# Patient Record
Sex: FEMALE | Race: WHITE | Employment: OTHER | URBAN - METROPOLITAN AREA
[De-identification: names, ages, dates, MRNs, and addresses within clinical notes are randomized per-mention and may not be internally consistent; named-entity substitution may affect disease eponyms.]

---

## 2018-03-28 ENCOUNTER — HOSPITAL ENCOUNTER (EMERGENCY)
Age: 68
Discharge: HOME | End: 2018-03-28
Attending: FAMILY MEDICINE
Payer: MEDICARE

## 2018-03-28 VITALS
BODY MASS INDEX: 24.45 KG/M2 | TEMPERATURE: 97.9 F | OXYGEN SATURATION: 94 % | DIASTOLIC BLOOD PRESSURE: 84 MMHG | RESPIRATION RATE: 18 BRPM | SYSTOLIC BLOOD PRESSURE: 180 MMHG | HEIGHT: 63 IN | HEART RATE: 78 BPM | WEIGHT: 138 LBS

## 2018-03-28 DIAGNOSIS — J40 BRONCHITIS: Primary | ICD-10-CM

## 2018-03-28 PROCEDURE — 99282 EMERGENCY DEPT VISIT SF MDM: CPT

## 2018-03-28 RX ORDER — HYDROXYCHLOROQUINE SULFATE 200 MG/1
TABLET, FILM COATED ORAL DAILY
COMMUNITY
End: 2020-11-09

## 2018-03-28 RX ORDER — PREDNISONE 10 MG/1
10 TABLET ORAL DAILY
Qty: 18 TABLET | Refills: 0 | Status: SHIPPED | OUTPATIENT
Start: 2018-03-28 | End: 2018-04-07

## 2018-03-28 RX ORDER — GABAPENTIN 300 MG/1
300 CAPSULE ORAL 2 TIMES DAILY
COMMUNITY

## 2018-03-28 RX ORDER — DOXYCYCLINE HYCLATE 100 MG
100 TABLET ORAL 2 TIMES DAILY
Qty: 20 TABLET | Refills: 0 | Status: SHIPPED | OUTPATIENT
Start: 2018-03-28 | End: 2018-04-07

## 2018-03-28 RX ORDER — BENZONATATE 100 MG/1
100 CAPSULE ORAL 3 TIMES DAILY PRN
COMMUNITY
End: 2020-11-09

## 2018-03-28 ASSESSMENT — ENCOUNTER SYMPTOMS
DIARRHEA: 0
VOMITING: 0
EYE PAIN: 0
WHEEZING: 1
EYE DISCHARGE: 0
SHORTNESS OF BREATH: 0
ABDOMINAL DISTENTION: 0
NAUSEA: 0
BACK PAIN: 0
SINUS PRESSURE: 0
SORE THROAT: 0
COUGH: 1
EYE REDNESS: 0

## 2018-03-28 NOTE — ED PROVIDER NOTES
reviewed. Procedures    MDM    ED Course      --------------------------------------------- PAST HISTORY ---------------------------------------------  Past Medical History:  has a past medical history of Anemia; Blood transfusion; Cancer ([facility] History of blood transfusion; Hx of blood clots; Hyperlipidemia; Hypertension; and PVD (peripheral vascular disease) ([facility] Past Surgical History:  has a past surgical history that includes Abdomen surgery; Hysterectomy; Appendectomy; Endoscopy, colon, diagnostic; Colonoscopy; other surgical history (2004); Colon surgery (2005); vascular surgery (2009); vascular surgery (2014); Tongue Biopsy; and transluminal angioplasty (Right, 4/15/15). Social History:  reports that she quit smoking about 14 years ago. She has a 60.00 pack-year smoking history. She has never used smokeless tobacco. She reports that she does not drink alcohol or use drugs. Family History: family history is not on file. The patients home medications have been reviewed. Allergies: Patient has no known allergies. -------------------------------------------------- RESULTS -------------------------------------------------  No results found for this visit on 03/28/18. No orders to display       ------------------------- NURSING NOTES AND VITALS REVIEWED ---------------------------   The nursing notes within the ED encounter and vital signs as below have been reviewed. BP (!) 180/84   Pulse 78   Temp 97.9 °F (36.6 °C) (Oral)   Resp 18   Ht 5' 3\" (1.6 m)   Wt 138 lb (62.6 kg)   SpO2 94%   BMI 24.45 kg/m²   Oxygen Saturation Interpretation: Normal      ------------------------------------------ PROGRESS NOTES ------------------------------------------   I have spoken with the patient and discussed todays results, in addition to providing specific details for the plan of care and counseling regarding the diagnosis and prognosis.   Their questions are answered at this time and they

## 2018-06-03 ENCOUNTER — HOSPITAL ENCOUNTER (EMERGENCY)
Age: 68
Discharge: HOME | End: 2018-06-03
Attending: EMERGENCY MEDICINE
Payer: MEDICARE

## 2018-06-03 ENCOUNTER — APPOINTMENT (OUTPATIENT)
Dept: GENERAL RADIOLOGY | Age: 68
End: 2018-06-03
Payer: MEDICARE

## 2018-06-03 VITALS
SYSTOLIC BLOOD PRESSURE: 156 MMHG | OXYGEN SATURATION: 93 % | RESPIRATION RATE: 20 BRPM | BODY MASS INDEX: 24.8 KG/M2 | WEIGHT: 140 LBS | TEMPERATURE: 98.4 F | DIASTOLIC BLOOD PRESSURE: 70 MMHG | HEART RATE: 75 BPM | HEIGHT: 63 IN

## 2018-06-03 DIAGNOSIS — J20.9 ACUTE BRONCHITIS, UNSPECIFIED ORGANISM: Primary | ICD-10-CM

## 2018-06-03 PROCEDURE — 99283 EMERGENCY DEPT VISIT LOW MDM: CPT

## 2018-06-03 PROCEDURE — 71046 X-RAY EXAM CHEST 2 VIEWS: CPT

## 2018-06-03 RX ORDER — DOXYCYCLINE HYCLATE 100 MG
100 TABLET ORAL 2 TIMES DAILY
Qty: 20 TABLET | Refills: 0 | Status: SHIPPED | OUTPATIENT
Start: 2018-06-03 | End: 2018-06-13

## 2018-06-03 ASSESSMENT — PAIN DESCRIPTION - PAIN TYPE: TYPE: ACUTE PAIN

## 2018-06-03 ASSESSMENT — PAIN DESCRIPTION - ORIENTATION: ORIENTATION: ANTERIOR

## 2018-06-03 ASSESSMENT — PAIN DESCRIPTION - LOCATION: LOCATION: CHEST

## 2018-06-03 ASSESSMENT — PAIN SCALES - GENERAL: PAINLEVEL_OUTOF10: 4

## 2018-07-16 ENCOUNTER — APPOINTMENT (OUTPATIENT)
Dept: GENERAL RADIOLOGY | Age: 68
DRG: 287 | End: 2018-07-16
Payer: MEDICARE

## 2018-07-16 ENCOUNTER — APPOINTMENT (OUTPATIENT)
Dept: CT IMAGING | Age: 68
DRG: 287 | End: 2018-07-16
Payer: MEDICARE

## 2018-07-16 ENCOUNTER — HOSPITAL ENCOUNTER (INPATIENT)
Age: 68
LOS: 2 days | Discharge: HOME | DRG: 287 | End: 2018-07-18
Attending: EMERGENCY MEDICINE | Admitting: FAMILY MEDICINE
Payer: MEDICARE

## 2018-07-16 DIAGNOSIS — R07.9 CHEST PAIN, UNSPECIFIED TYPE: Primary | ICD-10-CM

## 2018-07-16 LAB
ALBUMIN SERPL-MCNC: 4.6 G/DL (ref 3.5–5.2)
ALP BLD-CCNC: 79 U/L (ref 35–104)
ALT SERPL-CCNC: 20 U/L (ref 0–32)
ANION GAP SERPL CALCULATED.3IONS-SCNC: 15 MMOL/L (ref 7–16)
APTT: 28.3 SEC (ref 24.5–35.1)
AST SERPL-CCNC: 23 U/L (ref 0–31)
BASOPHILS ABSOLUTE: 0.05 E9/L (ref 0–0.2)
BASOPHILS RELATIVE PERCENT: 0.6 % (ref 0–2)
BILIRUB SERPL-MCNC: 0.5 MG/DL (ref 0–1.2)
BUN BLDV-MCNC: 8 MG/DL (ref 8–23)
CALCIUM SERPL-MCNC: 10.3 MG/DL (ref 8.6–10.2)
CHLORIDE BLD-SCNC: 99 MMOL/L (ref 98–107)
CO2: 26 MMOL/L (ref 22–29)
CREAT SERPL-MCNC: 0.8 MG/DL (ref 0.5–1)
EOSINOPHILS ABSOLUTE: 0.06 E9/L (ref 0.05–0.5)
EOSINOPHILS RELATIVE PERCENT: 0.8 % (ref 0–6)
GFR AFRICAN AMERICAN: >60
GFR NON-AFRICAN AMERICAN: >60 ML/MIN/1.73
GLUCOSE BLD-MCNC: 115 MG/DL (ref 74–109)
HCT VFR BLD CALC: 41.2 % (ref 34–48)
HEMOGLOBIN: 14.4 G/DL (ref 11.5–15.5)
IMMATURE GRANULOCYTES #: 0.04 E9/L
IMMATURE GRANULOCYTES %: 0.5 % (ref 0–5)
INR BLD: 1
LYMPHOCYTES ABSOLUTE: 1.19 E9/L (ref 1.5–4)
LYMPHOCYTES RELATIVE PERCENT: 15.3 % (ref 20–42)
MCH RBC QN AUTO: 32 PG (ref 26–35)
MCHC RBC AUTO-ENTMCNC: 35 % (ref 32–34.5)
MCV RBC AUTO: 91.6 FL (ref 80–99.9)
MONOCYTES ABSOLUTE: 0.6 E9/L (ref 0.1–0.95)
MONOCYTES RELATIVE PERCENT: 7.7 % (ref 2–12)
NEUTROPHILS ABSOLUTE: 5.85 E9/L (ref 1.8–7.3)
NEUTROPHILS RELATIVE PERCENT: 75.1 % (ref 43–80)
PDW BLD-RTO: 12.3 FL (ref 11.5–15)
PLATELET # BLD: 339 E9/L (ref 130–450)
PMV BLD AUTO: 10 FL (ref 7–12)
POTASSIUM SERPL-SCNC: 4 MMOL/L (ref 3.5–5)
PROTHROMBIN TIME: 11 SEC (ref 9.3–12.4)
RBC # BLD: 4.5 E12/L (ref 3.5–5.5)
SODIUM BLD-SCNC: 140 MMOL/L (ref 132–146)
TOTAL PROTEIN: 7.3 G/DL (ref 6.4–8.3)
TROPONIN: <0.01 NG/ML (ref 0–0.03)
TROPONIN: <0.01 NG/ML (ref 0–0.03)
WBC # BLD: 7.8 E9/L (ref 4.5–11.5)

## 2018-07-16 PROCEDURE — 6370000000 HC RX 637 (ALT 250 FOR IP): Performed by: FAMILY MEDICINE

## 2018-07-16 PROCEDURE — 2140000000 HC CCU INTERMEDIATE R&B

## 2018-07-16 PROCEDURE — 71275 CT ANGIOGRAPHY CHEST: CPT

## 2018-07-16 PROCEDURE — 6360000002 HC RX W HCPCS: Performed by: FAMILY MEDICINE

## 2018-07-16 PROCEDURE — 96372 THER/PROPH/DIAG INJ SC/IM: CPT

## 2018-07-16 PROCEDURE — 85025 COMPLETE CBC W/AUTO DIFF WBC: CPT

## 2018-07-16 PROCEDURE — 83036 HEMOGLOBIN GLYCOSYLATED A1C: CPT

## 2018-07-16 PROCEDURE — G0378 HOSPITAL OBSERVATION PER HR: HCPCS

## 2018-07-16 PROCEDURE — 71046 X-RAY EXAM CHEST 2 VIEWS: CPT

## 2018-07-16 PROCEDURE — 6370000000 HC RX 637 (ALT 250 FOR IP): Performed by: PHYSICIAN ASSISTANT

## 2018-07-16 PROCEDURE — 85730 THROMBOPLASTIN TIME PARTIAL: CPT

## 2018-07-16 PROCEDURE — 36415 COLL VENOUS BLD VENIPUNCTURE: CPT

## 2018-07-16 PROCEDURE — 99285 EMERGENCY DEPT VISIT HI MDM: CPT

## 2018-07-16 PROCEDURE — 84484 ASSAY OF TROPONIN QUANT: CPT

## 2018-07-16 PROCEDURE — 80053 COMPREHEN METABOLIC PANEL: CPT

## 2018-07-16 PROCEDURE — 6360000004 HC RX CONTRAST MEDICATION: Performed by: RADIOLOGY

## 2018-07-16 PROCEDURE — 84443 ASSAY THYROID STIM HORMONE: CPT

## 2018-07-16 PROCEDURE — 80061 LIPID PANEL: CPT

## 2018-07-16 PROCEDURE — 85610 PROTHROMBIN TIME: CPT

## 2018-07-16 PROCEDURE — 84439 ASSAY OF FREE THYROXINE: CPT

## 2018-07-16 RX ORDER — MYCOPHENOLATE MOFETIL 250 MG/1
1000 CAPSULE ORAL 2 TIMES DAILY
Status: DISCONTINUED | OUTPATIENT
Start: 2018-07-16 | End: 2018-07-18 | Stop reason: HOSPADM

## 2018-07-16 RX ORDER — SODIUM CHLORIDE 0.9 % (FLUSH) 0.9 %
10 SYRINGE (ML) INJECTION PRN
Status: DISCONTINUED | OUTPATIENT
Start: 2018-07-16 | End: 2018-07-18 | Stop reason: HOSPADM

## 2018-07-16 RX ORDER — 0.9 % SODIUM CHLORIDE 0.9 %
1000 INTRAVENOUS SOLUTION INTRAVENOUS ONCE
Status: DISCONTINUED | OUTPATIENT
Start: 2018-07-16 | End: 2018-07-16

## 2018-07-16 RX ORDER — SODIUM CHLORIDE 0.9 % (FLUSH) 0.9 %
10 SYRINGE (ML) INJECTION EVERY 12 HOURS SCHEDULED
Status: DISCONTINUED | OUTPATIENT
Start: 2018-07-16 | End: 2018-07-18 | Stop reason: HOSPADM

## 2018-07-16 RX ORDER — NITROGLYCERIN 0.4 MG/1
0.4 TABLET SUBLINGUAL EVERY 5 MIN PRN
Status: DISCONTINUED | OUTPATIENT
Start: 2018-07-16 | End: 2018-07-18 | Stop reason: HOSPADM

## 2018-07-16 RX ORDER — CETIRIZINE HYDROCHLORIDE 10 MG/1
10 TABLET ORAL DAILY
Status: DISCONTINUED | OUTPATIENT
Start: 2018-07-16 | End: 2018-07-18 | Stop reason: HOSPADM

## 2018-07-16 RX ORDER — ASPIRIN 81 MG/1
81 TABLET, CHEWABLE ORAL DAILY
Status: DISCONTINUED | OUTPATIENT
Start: 2018-07-16 | End: 2018-07-18 | Stop reason: HOSPADM

## 2018-07-16 RX ORDER — ASPIRIN 81 MG/1
324 TABLET, CHEWABLE ORAL ONCE
Status: DISCONTINUED | OUTPATIENT
Start: 2018-07-16 | End: 2018-07-16

## 2018-07-16 RX ORDER — VITAMIN E 268 MG
400 CAPSULE ORAL DAILY
Status: DISCONTINUED | OUTPATIENT
Start: 2018-07-17 | End: 2018-07-18 | Stop reason: HOSPADM

## 2018-07-16 RX ORDER — FERROUS SULFATE 325(65) MG
325 TABLET ORAL
Status: DISCONTINUED | OUTPATIENT
Start: 2018-07-17 | End: 2018-07-18 | Stop reason: HOSPADM

## 2018-07-16 RX ORDER — ACETAMINOPHEN 325 MG/1
650 TABLET ORAL EVERY 4 HOURS PRN
Status: DISCONTINUED | OUTPATIENT
Start: 2018-07-16 | End: 2018-07-18 | Stop reason: HOSPADM

## 2018-07-16 RX ORDER — ASPIRIN 81 MG/1
324 TABLET, CHEWABLE ORAL ONCE
Status: COMPLETED | OUTPATIENT
Start: 2018-07-16 | End: 2018-07-16

## 2018-07-16 RX ORDER — UBIDECARENONE 75 MG
250 CAPSULE ORAL 2 TIMES DAILY
Status: DISCONTINUED | OUTPATIENT
Start: 2018-07-16 | End: 2018-07-18 | Stop reason: HOSPADM

## 2018-07-16 RX ORDER — LORATADINE 10 MG/1
10 TABLET ORAL DAILY PRN
COMMUNITY

## 2018-07-16 RX ORDER — CLOPIDOGREL BISULFATE 75 MG/1
75 TABLET ORAL DAILY
Status: DISCONTINUED | OUTPATIENT
Start: 2018-07-16 | End: 2018-07-18 | Stop reason: HOSPADM

## 2018-07-16 RX ORDER — ASCORBIC ACID 500 MG
1000 TABLET ORAL DAILY
Status: DISCONTINUED | OUTPATIENT
Start: 2018-07-17 | End: 2018-07-18 | Stop reason: HOSPADM

## 2018-07-16 RX ORDER — OYSTER SHELL CALCIUM WITH VITAMIN D 500; 200 MG/1; [IU]/1
1 TABLET, FILM COATED ORAL 2 TIMES DAILY
Status: DISCONTINUED | OUTPATIENT
Start: 2018-07-16 | End: 2018-07-16 | Stop reason: SDUPTHER

## 2018-07-16 RX ORDER — SIMVASTATIN 20 MG
20 TABLET ORAL NIGHTLY
Status: DISCONTINUED | OUTPATIENT
Start: 2018-07-16 | End: 2018-07-18 | Stop reason: HOSPADM

## 2018-07-16 RX ORDER — ALBUTEROL SULFATE 90 UG/1
2 AEROSOL, METERED RESPIRATORY (INHALATION) EVERY 6 HOURS PRN
COMMUNITY

## 2018-07-16 RX ORDER — MYCOPHENOLATE MOFETIL 500 MG/1
1000 TABLET ORAL 2 TIMES DAILY
COMMUNITY
End: 2020-11-09

## 2018-07-16 RX ORDER — OYSTER SHELL CALCIUM WITH VITAMIN D 500; 200 MG/1; [IU]/1
1 TABLET, FILM COATED ORAL 2 TIMES DAILY
Status: DISCONTINUED | OUTPATIENT
Start: 2018-07-16 | End: 2018-07-18 | Stop reason: HOSPADM

## 2018-07-16 RX ORDER — ALBUTEROL SULFATE 90 UG/1
2 AEROSOL, METERED RESPIRATORY (INHALATION) EVERY 6 HOURS PRN
Status: DISCONTINUED | OUTPATIENT
Start: 2018-07-16 | End: 2018-07-18 | Stop reason: HOSPADM

## 2018-07-16 RX ORDER — HYDROXYCHLOROQUINE SULFATE 200 MG/1
200 TABLET, FILM COATED ORAL DAILY
Status: DISCONTINUED | OUTPATIENT
Start: 2018-07-16 | End: 2018-07-18 | Stop reason: HOSPADM

## 2018-07-16 RX ORDER — BENZONATATE 100 MG/1
100 CAPSULE ORAL 3 TIMES DAILY PRN
Status: DISCONTINUED | OUTPATIENT
Start: 2018-07-16 | End: 2018-07-18 | Stop reason: HOSPADM

## 2018-07-16 RX ORDER — LANOLIN ALCOHOL/MO/W.PET/CERES
100 CREAM (GRAM) TOPICAL DAILY
Status: DISCONTINUED | OUTPATIENT
Start: 2018-07-17 | End: 2018-07-18 | Stop reason: HOSPADM

## 2018-07-16 RX ORDER — LISINOPRIL 20 MG/1
40 TABLET ORAL DAILY
Status: DISCONTINUED | OUTPATIENT
Start: 2018-07-17 | End: 2018-07-18 | Stop reason: HOSPADM

## 2018-07-16 RX ORDER — GABAPENTIN 300 MG/1
300 CAPSULE ORAL 2 TIMES DAILY
Status: DISCONTINUED | OUTPATIENT
Start: 2018-07-16 | End: 2018-07-18 | Stop reason: HOSPADM

## 2018-07-16 RX ADMIN — ASPIRIN 81 MG CHEWABLE TABLET 324 MG: 81 TABLET CHEWABLE at 16:01

## 2018-07-16 RX ADMIN — SIMVASTATIN 20 MG: 20 TABLET, FILM COATED ORAL at 20:48

## 2018-07-16 RX ADMIN — IOPAMIDOL 60 ML: 755 INJECTION, SOLUTION INTRAVENOUS at 17:17

## 2018-07-16 RX ADMIN — ENOXAPARIN SODIUM 40 MG: 40 INJECTION SUBCUTANEOUS at 22:26

## 2018-07-16 RX ADMIN — MYCOPHENOLATE MOFETIL 1000 MG: 250 CAPSULE ORAL at 20:48

## 2018-07-16 RX ADMIN — CALCIUM CARBONATE-VITAMIN D TAB 500 MG-200 UNIT 1 TABLET: 500-200 TAB at 20:48

## 2018-07-16 RX ADMIN — GABAPENTIN 300 MG: 300 CAPSULE ORAL at 20:48

## 2018-07-16 RX ADMIN — VITAMIN B12 0.1 MG ORAL TABLET 250 MCG: 0.1 TABLET ORAL at 22:26

## 2018-07-16 ASSESSMENT — PAIN DESCRIPTION - LOCATION
LOCATION: CHEST
LOCATION: BREAST;CHEST

## 2018-07-16 ASSESSMENT — ENCOUNTER SYMPTOMS
ALLERGIC/IMMUNOLOGIC NEGATIVE: 1
CHEST TIGHTNESS: 1
CONSTIPATION: 0
COUGH: 0
DIARRHEA: 0
ABDOMINAL PAIN: 0
SHORTNESS OF BREATH: 1
VOMITING: 0
NAUSEA: 0

## 2018-07-16 ASSESSMENT — PAIN DESCRIPTION - DESCRIPTORS
DESCRIPTORS: SQUEEZING
DESCRIPTORS: SQUEEZING
DESCRIPTORS: TIGHTNESS

## 2018-07-16 ASSESSMENT — PAIN SCALES - GENERAL
PAINLEVEL_OUTOF10: 8
PAINLEVEL_OUTOF10: 4
PAINLEVEL_OUTOF10: 5

## 2018-07-16 ASSESSMENT — PAIN DESCRIPTION - ORIENTATION
ORIENTATION: LEFT
ORIENTATION: LEFT

## 2018-07-16 ASSESSMENT — PAIN DESCRIPTION - PAIN TYPE
TYPE: ACUTE PAIN

## 2018-07-16 ASSESSMENT — PAIN DESCRIPTION - ONSET
ONSET: ON-GOING
ONSET: ON-GOING

## 2018-07-16 ASSESSMENT — HEART SCORE: ECG: 1

## 2018-07-16 ASSESSMENT — PAIN DESCRIPTION - FREQUENCY
FREQUENCY: CONTINUOUS
FREQUENCY: CONTINUOUS

## 2018-07-16 NOTE — ED PROVIDER NOTES
Bridge and admit orders at this time. I have discussed the results of the workup as well as the plan with the patient and family who indicate understanding and agreement with the plan. All questions answered at this time. --------------------------------------------- PAST HISTORY ---------------------------------------------  Past Medical History:  has a past medical history of Anemia; Blood transfusion; Cancer ([facility] History of blood transfusion; Hx of blood clots; Hyperlipidemia; Hypertension; and PVD (peripheral vascular disease) ([facility] Past Surgical History:  has a past surgical history that includes Abdomen surgery; Hysterectomy; Appendectomy; Endoscopy, colon, diagnostic; Colonoscopy; other surgical history (2004); Colon surgery (2005); vascular surgery (2009); vascular surgery (2014); Tongue Biopsy; and transluminal angioplasty (Right, 4/15/15). Social History:  reports that she quit smoking about 14 years ago. She has a 60.00 pack-year smoking history. She has never used smokeless tobacco. She reports that she does not drink alcohol or use drugs. Family History: family history is not on file. The patients home medications have been reviewed. Allergies: Patient has no known allergies.     -------------------------------------------------- RESULTS -------------------------------------------------    LABS:  Results for orders placed or performed during the hospital encounter of 07/16/18   CBC Auto Differential   Result Value Ref Range    WBC 7.8 4.5 - 11.5 E9/L    RBC 4.50 3.50 - 5.50 E12/L    Hemoglobin 14.4 11.5 - 15.5 g/dL    Hematocrit 41.2 34.0 - 48.0 %    MCV 91.6 80.0 - 99.9 fL    MCH 32.0 26.0 - 35.0 pg    MCHC 35.0 (H) 32.0 - 34.5 %    RDW 12.3 11.5 - 15.0 fL    Platelets 700 174 - 269 E9/L    MPV 10.0 7.0 - 12.0 fL    Neutrophils % 75.1 43.0 - 80.0 %    Immature Granulocytes % 0.5 0.0 - 5.0 %    Lymphocytes % 15.3 (L) 20.0 - 42.0 %    Monocytes % 7.7 2.0 - 12.0 %

## 2018-07-16 NOTE — ED NOTES
FIRST PROVIDER CONTACT ASSESSMENT NOTE      Department of Emergency Medicine   7/16/18  1:31 PM    Chief Complaint: Chest Pain (c/o chest tightness in chest x 2 weeks)      History of Present Illness:    Ede Dunne is a 77 y.o. female who presents to the ED by private car for Left-sided chest pain. This began over a week ago. She states that it feels like they're squeezing in her left breast. Mild shortness of breath. History of GERD arterial occlusive disease of the right leg. She's never had any stress test or cardiac disease. Sent in by her family physician      Focused Screening Exam:  Constitutional:  Alert, appears stated age and is in no distress. *ALLERGIES*     Patient has no known allergies.      ED Triage Vitals [07/16/18 1307]   BP Temp Temp Source Pulse Resp SpO2 Height Weight   (!) 172/109 97.7 °F (36.5 °C) Temporal 87 17 93 % -- 138 lb (62.6 kg)        Initial Plan of Care:  Initiate Treatment-Testing, Proceed toTreatment Area When Bed Available for ED Attending/MLP to Continue Care    -----------------END OF FIRST PROVIDER CONTACT ASSESSMENT NOTE--------------  Electronically signed by Sophie Oppenheim, PA-C   DD: 7/16/18       Sophie Oppenheim, PA-C  07/16/18 6305

## 2018-07-17 ENCOUNTER — APPOINTMENT (OUTPATIENT)
Dept: NUCLEAR MEDICINE | Age: 68
DRG: 287 | End: 2018-07-17
Payer: MEDICARE

## 2018-07-17 LAB
CHOLESTEROL, TOTAL: 200 MG/DL (ref 0–199)
HDLC SERPL-MCNC: 75 MG/DL
LDL CHOLESTEROL CALCULATED: 105 MG/DL (ref 0–99)
LV EF: 45 %
LVEF MODALITY: NORMAL
T4 FREE: 1.43 NG/DL (ref 0.93–1.7)
TRIGL SERPL-MCNC: 101 MG/DL (ref 0–149)
TROPONIN: <0.01 NG/ML (ref 0–0.03)
TSH SERPL DL<=0.05 MIU/L-ACNC: 2.02 UIU/ML (ref 0.27–4.2)
VLDLC SERPL CALC-MCNC: 20 MG/DL

## 2018-07-17 PROCEDURE — 78452 HT MUSCLE IMAGE SPECT MULT: CPT

## 2018-07-17 PROCEDURE — 6370000000 HC RX 637 (ALT 250 FOR IP): Performed by: FAMILY MEDICINE

## 2018-07-17 PROCEDURE — G0378 HOSPITAL OBSERVATION PER HR: HCPCS

## 2018-07-17 PROCEDURE — 96372 THER/PROPH/DIAG INJ SC/IM: CPT

## 2018-07-17 PROCEDURE — APPSS60 APP SPLIT SHARED TIME 46-60 MINUTES: Performed by: NURSE PRACTITIONER

## 2018-07-17 PROCEDURE — 6360000002 HC RX W HCPCS: Performed by: INTERNAL MEDICINE

## 2018-07-17 PROCEDURE — 6370000000 HC RX 637 (ALT 250 FOR IP): Performed by: INTERNAL MEDICINE

## 2018-07-17 PROCEDURE — 84484 ASSAY OF TROPONIN QUANT: CPT

## 2018-07-17 PROCEDURE — 3430000000 HC RX DIAGNOSTIC RADIOPHARMACEUTICAL: Performed by: RADIOLOGY

## 2018-07-17 PROCEDURE — 2140000000 HC CCU INTERMEDIATE R&B

## 2018-07-17 PROCEDURE — A9500 TC99M SESTAMIBI: HCPCS | Performed by: RADIOLOGY

## 2018-07-17 PROCEDURE — 93017 CV STRESS TEST TRACING ONLY: CPT

## 2018-07-17 PROCEDURE — 6360000002 HC RX W HCPCS: Performed by: FAMILY MEDICINE

## 2018-07-17 PROCEDURE — 2580000003 HC RX 258: Performed by: FAMILY MEDICINE

## 2018-07-17 PROCEDURE — 99223 1ST HOSP IP/OBS HIGH 75: CPT | Performed by: INTERNAL MEDICINE

## 2018-07-17 PROCEDURE — 36415 COLL VENOUS BLD VENIPUNCTURE: CPT

## 2018-07-17 RX ADMIN — VITAMIN B12 0.1 MG ORAL TABLET 250 MCG: 0.1 TABLET ORAL at 09:22

## 2018-07-17 RX ADMIN — ASPIRIN 81 MG 81 MG: 81 TABLET ORAL at 09:24

## 2018-07-17 RX ADMIN — MYCOPHENOLATE MOFETIL 1000 MG: 250 CAPSULE ORAL at 09:22

## 2018-07-17 RX ADMIN — ACETAMINOPHEN 650 MG: 325 TABLET, FILM COATED ORAL at 06:28

## 2018-07-17 RX ADMIN — Medication 12 MILLICURIE: at 09:53

## 2018-07-17 RX ADMIN — REGADENOSON 0.4 MG: 0.08 INJECTION, SOLUTION INTRAVENOUS at 11:30

## 2018-07-17 RX ADMIN — VITAMIN D, TAB 1000IU (100/BT) 1000 UNITS: 25 TAB at 09:24

## 2018-07-17 RX ADMIN — CALCIUM CARBONATE-VITAMIN D TAB 500 MG-200 UNIT 1 TABLET: 500-200 TAB at 21:05

## 2018-07-17 RX ADMIN — MYCOPHENOLATE MOFETIL 1000 MG: 250 CAPSULE ORAL at 21:05

## 2018-07-17 RX ADMIN — CLOPIDOGREL 75 MG: 75 TABLET, FILM COATED ORAL at 09:23

## 2018-07-17 RX ADMIN — GABAPENTIN 300 MG: 300 CAPSULE ORAL at 09:23

## 2018-07-17 RX ADMIN — Medication 10 ML: at 21:06

## 2018-07-17 RX ADMIN — ACETAMINOPHEN 650 MG: 325 TABLET, FILM COATED ORAL at 18:43

## 2018-07-17 RX ADMIN — ENOXAPARIN SODIUM 40 MG: 40 INJECTION SUBCUTANEOUS at 09:24

## 2018-07-17 RX ADMIN — VITAMIN B12 0.1 MG ORAL TABLET 250 MCG: 0.1 TABLET ORAL at 21:06

## 2018-07-17 RX ADMIN — SIMVASTATIN 20 MG: 20 TABLET, FILM COATED ORAL at 21:06

## 2018-07-17 RX ADMIN — LISINOPRIL 40 MG: 20 TABLET ORAL at 09:23

## 2018-07-17 RX ADMIN — CETIRIZINE HYDROCHLORIDE 10 MG: 10 TABLET, FILM COATED ORAL at 14:07

## 2018-07-17 RX ADMIN — GABAPENTIN 300 MG: 300 CAPSULE ORAL at 21:06

## 2018-07-17 RX ADMIN — VITAMIN E CAP 400 UNIT 400 UNITS: 400 CAP at 09:23

## 2018-07-17 RX ADMIN — PYRIDOXINE HCL TAB 50 MG 100 MG: 50 TAB at 09:23

## 2018-07-17 RX ADMIN — Medication 10 ML: at 09:25

## 2018-07-17 RX ADMIN — CALCIUM CARBONATE-VITAMIN D TAB 500 MG-200 UNIT 1 TABLET: 500-200 TAB at 09:23

## 2018-07-17 RX ADMIN — ACETAMINOPHEN 650 MG: 325 TABLET, FILM COATED ORAL at 14:06

## 2018-07-17 RX ADMIN — Medication 1000 MG: at 09:23

## 2018-07-17 RX ADMIN — METOPROLOL TARTRATE 25 MG: 25 TABLET ORAL at 21:05

## 2018-07-17 RX ADMIN — Medication 34.6 MILLICURIE: at 11:30

## 2018-07-17 RX ADMIN — FERROUS SULFATE TAB 325 MG (65 MG ELEMENTAL FE) 325 MG: 325 (65 FE) TAB at 09:23

## 2018-07-17 ASSESSMENT — PAIN DESCRIPTION - FREQUENCY: FREQUENCY: CONTINUOUS

## 2018-07-17 ASSESSMENT — PAIN SCALES - GENERAL
PAINLEVEL_OUTOF10: 4
PAINLEVEL_OUTOF10: 7
PAINLEVEL_OUTOF10: 5
PAINLEVEL_OUTOF10: 8
PAINLEVEL_OUTOF10: 6

## 2018-07-17 ASSESSMENT — ENCOUNTER SYMPTOMS
HEARTBURN: 0
SHORTNESS OF BREATH: 0
BLURRED VISION: 0

## 2018-07-17 ASSESSMENT — PAIN DESCRIPTION - PAIN TYPE: TYPE: ACUTE PAIN

## 2018-07-17 ASSESSMENT — PAIN DESCRIPTION - ONSET: ONSET: ON-GOING

## 2018-07-17 ASSESSMENT — PAIN DESCRIPTION - DESCRIPTORS: DESCRIPTORS: TIGHTNESS

## 2018-07-17 ASSESSMENT — PAIN DESCRIPTION - LOCATION: LOCATION: CHEST

## 2018-07-17 NOTE — CONSULTS
Daily  vitamin D (CHOLECALCIFEROL) tablet 1,000 Units, 1,000 Units, Oral, Daily  hydroxychloroquine (PLAQUENIL) tablet 200 mg, 200 mg, Oral, Daily  benzonatate (TESSALON) capsule 100 mg, 100 mg, Oral, TID PRN  aspirin chewable tablet 81 mg, 81 mg, Oral, Daily  nitroGLYCERIN (NITROSTAT) SL tablet 0.4 mg, 0.4 mg, Sublingual, Q5 Min PRN  regadenoson (LEXISCAN) injection 0.4 mg, 0.4 mg, Intravenous, ONCE PRN  perflutren lipid microspheres (DEFINITY) injection 1.65 mg, 1.5 mL, Intravenous, ONCE PRN    Allergies:  NKDA    Social History:    Tobacco 2 ppd x 30 years quit in 2004  [place] [address]: Socially  Illicit Drugs: Denies  Caffeine: 1 cup coffee  Activity: Lives alone in [place]. Drives. 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 Reports no CP or SOB walking up and down aisles in grocery store or cleaning home. Code Status: Full Code      Family History: Non contributory secondary to age    REVIEW OF SYSTEMS:     · Constitutional: Denies fatigue, fevers, chills or night sweats  · Eyes: Denies visual changes or drainage  · ENT: + sinus headaches. Denies hearing loss. No mouth sores or sore throat. No epistaxis   · Cardiovascular: + chest pain. No palpitations. No lower extremity swelling. · Respiratory: Denies LAO, cough, orthopnea or PND. No hemoptysis   · Gastrointestinal: Denies hematemesis or anorexia. No hematochezia or melena    · Genitourinary: Denies urgency, dysuria or hematuria. · Musculoskeletal: Denies gait disturbance, weakness or joint complaints  · Integumentary: Denies rash, hives or pruritis   · Neurological: Denies dizziness, headaches or seizures. No numbness or tingling  · Psychiatric: Denies anxiety or depression. · Endocrine: Denies temperature intolerance. No recent weight change. .  · Hematologic/Lymphatic: Denies abnormal bruising or bleeding.  No swollen lymph nodes    PHYSICAL EXAM:   BP (!) 169/73   Pulse 80   Temp 96.7 °F (35.9 °C) (Temporal)   Resp 16   Wt 137 lb (62.1 kg)   SpO2 98% extremity edema, no varicosities. Distal pulses palpable, no clubbing or cyanosis   ABDOMEN: Soft, nontender,  Bowel sounds present. MS: good muscle strength and tone. : Deferred  Rectal Exam: Deferred  SKIN: warm and dry  NEURO / PSYCH: oriented to person, place        Impression/Recommendations:    Chest pain: MI ruled out - Lexiscan Stress test today due to CP and CAD risk factors    Essential HTN: Monitor BP    PVD: s/p s/p multiple Right SFA-Follows with Dr Osman Gutierrez    Dyslipidemia: On Statins    COPD/Emphysema:     Ex heavy smoker. Thank you for the consult.         Kingston Hoffmann MD  7/17/2018  8:38 AM  [facility]) Cardiology

## 2018-07-17 NOTE — CONSULTS
Stress test noted; Anterior defect with wall motion abnormality, EF 45%    Will keep her NPO and start low dose Beta blockerss    Recommend cardiac cath;  Will discuss tomorrow morning;         Electronically signed by Dewayne Finch MD on 7/17/2018 at 4:55 PM

## 2018-07-17 NOTE — H&P
tablet 75 mg  75 mg Oral Daily Martina Ling MD        lisinopril (PRINIVIL;ZESTRIL) tablet 40 mg  40 mg Oral Daily Martina Ling MD        simvastatin (ZOCOR) tablet 20 mg  20 mg Oral Nightly Martina Ling MD   20 mg at 07/16/18 2048    ferrous sulfate tablet 325 mg  325 mg Oral Daily with breakfast Martina Ling MD        vitamin B-12 (CYANOCOBALAMIN) tablet 250 mcg  250 mcg Oral BID Martina Ling MD   250 mcg at 07/16/18 2226    vitamin B-6 (PYRIDOXINE) tablet 100 mg  100 mg Oral Daily Martina Ling MD        vitamin C (ASCORBIC ACID) tablet 1,000 mg  1,000 mg Oral Daily Martina Ling MD        vitamin E capsule 400 Units  400 Units Oral Daily Martina Ling MD        gabapentin (NEURONTIN) capsule 300 mg  300 mg Oral BID Martina Ling MD   300 mg at 07/16/18 2048    albuterol sulfate  (90 Base) MCG/ACT inhaler 2 puff  2 puff Inhalation Q6H PRN Martina Ling MD        calcium-vitamin D (OSCAL-500) 500-200 MG-UNIT per tablet 1 tablet  1 tablet Oral BID Martina Ling MD   1 tablet at 07/16/18 2048    mycophenolate (CELLCEPT) capsule 1,000 mg  1,000 mg Oral BID Martina Ling MD   1,000 mg at 07/16/18 2048    cetirizine (ZYRTEC) tablet 10 mg  10 mg Oral Daily Martina Ling MD        vitamin D (CHOLECALCIFEROL) tablet 1,000 Units  1,000 Units Oral Daily Martina Ling MD        hydroxychloroquine (PLAQUENIL) tablet 200 mg  200 mg Oral Daily Martina Ling MD        benzonatate (TESSALON) capsule 100 mg  100 mg Oral TID PRN Martina Ling MD        aspirin chewable tablet 81 mg  81 mg Oral Daily Martina Ling MD        nitroGLYCERIN (NITROSTAT) SL tablet 0.4 mg  0.4 mg Sublingual Q5 Min PRN Martina Ling MD        regadenoson [facility]) injection 0.4 mg  0.4 mg Intravenous ONCE PRN MD Jean Pierre Colon lipid

## 2018-07-18 VITALS
BODY MASS INDEX: 25.06 KG/M2 | DIASTOLIC BLOOD PRESSURE: 67 MMHG | SYSTOLIC BLOOD PRESSURE: 135 MMHG | TEMPERATURE: 97.5 F | OXYGEN SATURATION: 93 % | RESPIRATION RATE: 18 BRPM | HEART RATE: 58 BPM | HEIGHT: 62 IN | WEIGHT: 136.2 LBS

## 2018-07-18 LAB
ABO/RH: NORMAL
ANTIBODY SCREEN: NORMAL
LV EF: 56 %
LVEF MODALITY: NORMAL
TROPONIN: <0.01 NG/ML (ref 0–0.03)

## 2018-07-18 PROCEDURE — 93458 L HRT ARTERY/VENTRICLE ANGIO: CPT | Performed by: INTERNAL MEDICINE

## 2018-07-18 PROCEDURE — 84484 ASSAY OF TROPONIN QUANT: CPT

## 2018-07-18 PROCEDURE — 86900 BLOOD TYPING SEROLOGIC ABO: CPT

## 2018-07-18 PROCEDURE — C1894 INTRO/SHEATH, NON-LASER: HCPCS

## 2018-07-18 PROCEDURE — 6370000000 HC RX 637 (ALT 250 FOR IP): Performed by: FAMILY MEDICINE

## 2018-07-18 PROCEDURE — 2580000003 HC RX 258: Performed by: FAMILY MEDICINE

## 2018-07-18 PROCEDURE — 2580000003 HC RX 258: Performed by: INTERNAL MEDICINE

## 2018-07-18 PROCEDURE — C1887 CATHETER, GUIDING: HCPCS

## 2018-07-18 PROCEDURE — B2151ZZ FLUOROSCOPY OF LEFT HEART USING LOW OSMOLAR CONTRAST: ICD-10-PCS | Performed by: INTERNAL MEDICINE

## 2018-07-18 PROCEDURE — 99233 SBSQ HOSP IP/OBS HIGH 50: CPT | Performed by: INTERNAL MEDICINE

## 2018-07-18 PROCEDURE — 86901 BLOOD TYPING SEROLOGIC RH(D): CPT

## 2018-07-18 PROCEDURE — 99152 MOD SED SAME PHYS/QHP 5/>YRS: CPT | Performed by: INTERNAL MEDICINE

## 2018-07-18 PROCEDURE — 96372 THER/PROPH/DIAG INJ SC/IM: CPT

## 2018-07-18 PROCEDURE — G0378 HOSPITAL OBSERVATION PER HR: HCPCS

## 2018-07-18 PROCEDURE — B2111ZZ FLUOROSCOPY OF MULTIPLE CORONARY ARTERIES USING LOW OSMOLAR CONTRAST: ICD-10-PCS | Performed by: INTERNAL MEDICINE

## 2018-07-18 PROCEDURE — 2500000003 HC RX 250 WO HCPCS

## 2018-07-18 PROCEDURE — 93306 TTE W/DOPPLER COMPLETE: CPT

## 2018-07-18 PROCEDURE — 6360000002 HC RX W HCPCS

## 2018-07-18 PROCEDURE — 2709999900 HC NON-CHARGEABLE SUPPLY

## 2018-07-18 PROCEDURE — C1769 GUIDE WIRE: HCPCS

## 2018-07-18 PROCEDURE — 4A023N7 MEASUREMENT OF CARDIAC SAMPLING AND PRESSURE, LEFT HEART, PERCUTANEOUS APPROACH: ICD-10-PCS | Performed by: INTERNAL MEDICINE

## 2018-07-18 PROCEDURE — 86850 RBC ANTIBODY SCREEN: CPT

## 2018-07-18 PROCEDURE — 36415 COLL VENOUS BLD VENIPUNCTURE: CPT

## 2018-07-18 PROCEDURE — 6360000002 HC RX W HCPCS: Performed by: FAMILY MEDICINE

## 2018-07-18 PROCEDURE — 6370000000 HC RX 637 (ALT 250 FOR IP): Performed by: INTERNAL MEDICINE

## 2018-07-18 RX ORDER — METOPROLOL SUCCINATE 25 MG/1
25 TABLET, EXTENDED RELEASE ORAL DAILY
Status: DISCONTINUED | OUTPATIENT
Start: 2018-07-19 | End: 2018-07-18 | Stop reason: HOSPADM

## 2018-07-18 RX ORDER — METOPROLOL SUCCINATE 25 MG/1
25 TABLET, EXTENDED RELEASE ORAL DAILY
Qty: 30 TABLET | Refills: 3 | Status: SHIPPED | OUTPATIENT
Start: 2018-07-19

## 2018-07-18 RX ORDER — SODIUM CHLORIDE 9 MG/ML
INJECTION, SOLUTION INTRAVENOUS ONCE
Status: COMPLETED | OUTPATIENT
Start: 2018-07-18 | End: 2018-07-18

## 2018-07-18 RX ADMIN — CLOPIDOGREL 75 MG: 75 TABLET, FILM COATED ORAL at 08:30

## 2018-07-18 RX ADMIN — VITAMIN B12 0.1 MG ORAL TABLET 250 MCG: 0.1 TABLET ORAL at 08:31

## 2018-07-18 RX ADMIN — GABAPENTIN 300 MG: 300 CAPSULE ORAL at 08:30

## 2018-07-18 RX ADMIN — Medication 10 ML: at 08:34

## 2018-07-18 RX ADMIN — CETIRIZINE HYDROCHLORIDE 10 MG: 10 TABLET, FILM COATED ORAL at 15:09

## 2018-07-18 RX ADMIN — ENOXAPARIN SODIUM 40 MG: 40 INJECTION SUBCUTANEOUS at 08:31

## 2018-07-18 RX ADMIN — MYCOPHENOLATE MOFETIL 1000 MG: 250 CAPSULE ORAL at 08:31

## 2018-07-18 RX ADMIN — Medication 1000 MG: at 08:30

## 2018-07-18 RX ADMIN — FERROUS SULFATE TAB 325 MG (65 MG ELEMENTAL FE) 325 MG: 325 (65 FE) TAB at 08:30

## 2018-07-18 RX ADMIN — ACETAMINOPHEN 650 MG: 325 TABLET, FILM COATED ORAL at 00:10

## 2018-07-18 RX ADMIN — PYRIDOXINE HCL TAB 50 MG 100 MG: 50 TAB at 08:30

## 2018-07-18 RX ADMIN — ASPIRIN 81 MG 81 MG: 81 TABLET ORAL at 08:32

## 2018-07-18 RX ADMIN — LISINOPRIL 40 MG: 20 TABLET ORAL at 08:30

## 2018-07-18 RX ADMIN — SODIUM CHLORIDE: 9 INJECTION, SOLUTION INTRAVENOUS at 15:06

## 2018-07-18 RX ADMIN — ACETAMINOPHEN 650 MG: 325 TABLET, FILM COATED ORAL at 08:31

## 2018-07-18 RX ADMIN — VITAMIN E CAP 400 UNIT 400 UNITS: 400 CAP at 08:30

## 2018-07-18 RX ADMIN — HYDROXYCHLOROQUINE SULFATE 200 MG: 200 TABLET, FILM COATED ORAL at 08:30

## 2018-07-18 RX ADMIN — CALCIUM CARBONATE-VITAMIN D TAB 500 MG-200 UNIT 1 TABLET: 500-200 TAB at 08:30

## 2018-07-18 RX ADMIN — ACETAMINOPHEN 650 MG: 325 TABLET, FILM COATED ORAL at 16:27

## 2018-07-18 RX ADMIN — METOPROLOL TARTRATE 25 MG: 25 TABLET ORAL at 08:31

## 2018-07-18 RX ADMIN — VITAMIN D, TAB 1000IU (100/BT) 1000 UNITS: 25 TAB at 08:30

## 2018-07-18 ASSESSMENT — PAIN DESCRIPTION - PAIN TYPE
TYPE: ACUTE PAIN

## 2018-07-18 ASSESSMENT — PAIN DESCRIPTION - ORIENTATION
ORIENTATION: LOWER;MID
ORIENTATION: LOWER
ORIENTATION: LOWER;MID

## 2018-07-18 ASSESSMENT — PAIN DESCRIPTION - LOCATION
LOCATION: HEAD
LOCATION: CHEST;BACK

## 2018-07-18 ASSESSMENT — PAIN DESCRIPTION - PROGRESSION
CLINICAL_PROGRESSION: GRADUALLY IMPROVING
CLINICAL_PROGRESSION: GRADUALLY WORSENING

## 2018-07-18 ASSESSMENT — PAIN DESCRIPTION - ONSET
ONSET: ON-GOING

## 2018-07-18 ASSESSMENT — PAIN DESCRIPTION - DESCRIPTORS
DESCRIPTORS: ACHING;CONSTANT;DISCOMFORT;PRESSURE
DESCRIPTORS: ACHING;CONSTANT;DISCOMFORT
DESCRIPTORS: ACHING;DISCOMFORT;HEADACHE
DESCRIPTORS: ACHING;CONSTANT;DISCOMFORT;PRESSURE

## 2018-07-18 ASSESSMENT — PAIN SCALES - GENERAL
PAINLEVEL_OUTOF10: 7
PAINLEVEL_OUTOF10: 8
PAINLEVEL_OUTOF10: 7
PAINLEVEL_OUTOF10: 7
PAINLEVEL_OUTOF10: 0
PAINLEVEL_OUTOF10: 0
PAINLEVEL_OUTOF10: 4

## 2018-07-18 ASSESSMENT — PAIN DESCRIPTION - FREQUENCY
FREQUENCY: CONTINUOUS

## 2018-07-18 NOTE — PROGRESS NOTES
Called Cristian Sotelo to see if patient was to have heart cath today. She said will talk to Dr. Mata Madden and let us know.
Left a message for echo dept at ext #[phone] regarding patient's echocardiogram pending discharge.      Verito Braswell
Lungs:  Normal effort and normal respiratory rate. Breath sounds clear to auscultation. Heart: Normal rate. Regular rhythm. S1 normal and S2 normal.    Abdomen: There is no abdominal tenderness. Assessment:  (Chest pain  COPD  HTN  Hyperlipidemia  PVD  History of DVT's). Plan:   (Discussed with patient and family she needs cardiac cath as stress test was abnormal.  Cardiology to see her today to discuss further. Continue meds. Further management pending outcome. ).        Rosa Hardy MD  7/18/2018

## 2018-07-18 NOTE — OP NOTE
Indication:  1. \"abnormal stress\" per radiology    Procedure: Left Heart Catheterization, coronary angiography, left ventriculography    Anesthesia:  Fentanyl, Benadryl   Time sedation was administered: 1414. I was present in the room when sedation was administered. Procedure end time: 9040  Time spent with face to face monitoring of moderate sedation: 20 min    LHC performed via right radial approach using a Slender 6 sheath. 2.5mg of diluted Verapamil and 200mcg of nitroglycerine administered through the sheath. 5000U heparin administered IV. Findings:  Left main: 0%  stenosis  LAD: 0. %  stenosis  Circumflex: 0. %   stenosis  RCA: Dominant. 0. %  stenosis  LV angio: 50%  ejection fraction    Hemodynamics:  LV: 100 mmHg. EDP: 6   No gradient across AV. Ao: 97/56. Sheath removed and TR band applied. There was good hemostasis achieved and the distal pulses were intact.      Complication: None   Estimated blood loss: 5 cc  Contrast use: 52 cc    Post op diagnosis:  Normal coronary arteries  Low normal LVEF  False positve stress test    PLAN:  Per dr. Kp Redd

## 2018-07-19 LAB — HBA1C MFR BLD: 5.2 % (ref 4–5.6)

## 2018-08-07 LAB
EKG ATRIAL RATE: 78 BPM
EKG P AXIS: 61 DEGREES
EKG P-R INTERVAL: 150 MS
EKG Q-T INTERVAL: 378 MS
EKG QRS DURATION: 96 MS
EKG QTC CALCULATION (BAZETT): 430 MS
EKG R AXIS: 44 DEGREES
EKG T AXIS: 51 DEGREES
EKG VENTRICULAR RATE: 78 BPM

## 2018-10-12 ENCOUNTER — HOSPITAL ENCOUNTER (OUTPATIENT)
Dept: GENERAL RADIOLOGY | Age: 68
Discharge: HOME | End: 2018-10-14
Payer: MEDICARE

## 2018-10-12 DIAGNOSIS — Z12.31 VISIT FOR SCREENING MAMMOGRAM: ICD-10-CM

## 2018-10-12 PROCEDURE — 77063 BREAST TOMOSYNTHESIS BI: CPT

## 2018-10-16 ENCOUNTER — TELEPHONE (OUTPATIENT)
Dept: ONCOLOGY | Age: 68
End: 2018-10-16

## 2018-10-18 ENCOUNTER — HOSPITAL ENCOUNTER (OUTPATIENT)
Dept: GENERAL RADIOLOGY | Age: 68
Discharge: HOME | End: 2018-10-20
Payer: MEDICARE

## 2018-10-18 DIAGNOSIS — R92.8 ABNORMAL MAMMOGRAM: ICD-10-CM

## 2018-10-18 PROCEDURE — 76642 ULTRASOUND BREAST LIMITED: CPT

## 2018-10-19 ENCOUNTER — TELEPHONE (OUTPATIENT)
Dept: GENERAL RADIOLOGY | Age: 68
End: 2018-10-19

## 2018-10-19 NOTE — TELEPHONE ENCOUNTER
Left message for April at Dr. Allan Fernandez office  to clarify need for order for breast biopsy from Dr. Lee Ann Rodriguez as patient has not been referred to a surgeon in the breast center and to see if he is willing to give instructions on stopping Plavix and Aspirin prior to the biopsy since Dr. Jose Mobley is reportedly out of town until Wednesday 10/24/18. I requested that April return my call.   Electronically signed by Alley Clifford, RN, BSN on 10/19/2018 at 1:54 PM

## 2018-10-24 ENCOUNTER — TELEPHONE (OUTPATIENT)
Dept: GENERAL RADIOLOGY | Age: 68
End: 2018-10-24

## 2018-10-29 ENCOUNTER — HOSPITAL ENCOUNTER (OUTPATIENT)
Dept: GENERAL RADIOLOGY | Age: 68
Discharge: HOME | End: 2018-10-31
Payer: MEDICARE

## 2018-10-29 DIAGNOSIS — N63.20 MASS OF LEFT BREAST: ICD-10-CM

## 2018-10-29 PROCEDURE — 77065 DX MAMMO INCL CAD UNI: CPT

## 2018-10-29 PROCEDURE — 2500000003 HC RX 250 WO HCPCS

## 2018-10-29 PROCEDURE — C1894 INTRO/SHEATH, NON-LASER: HCPCS

## 2018-10-29 PROCEDURE — 88305 TISSUE EXAM BY PATHOLOGIST: CPT

## 2018-10-30 ENCOUNTER — TELEPHONE (OUTPATIENT)
Dept: GENERAL RADIOLOGY | Age: 68
End: 2018-10-30

## 2018-10-30 NOTE — TELEPHONE ENCOUNTER
Left message to call me if she has questions to call me if she has questions about her breast biopsy.  Electronically signed by Lynn Aviles RN, BSN on 10/30/2018 at 9:23 AM

## 2018-11-02 ENCOUNTER — TELEPHONE (OUTPATIENT)
Dept: GENERAL RADIOLOGY | Age: 68
End: 2018-11-02

## 2019-11-13 ENCOUNTER — HOSPITAL ENCOUNTER (OUTPATIENT)
Dept: GENERAL RADIOLOGY | Age: 69
Discharge: HOME | End: 2019-11-15
Payer: MEDICARE

## 2019-11-13 DIAGNOSIS — Z12.31 VISIT FOR SCREENING MAMMOGRAM: ICD-10-CM

## 2019-11-13 PROCEDURE — 77067 SCR MAMMO BI INCL CAD: CPT

## 2020-10-21 ENCOUNTER — HOSPITAL ENCOUNTER (OUTPATIENT)
Dept: MRI IMAGING | Age: 70
Discharge: HOME | End: 2020-10-23
Payer: MEDICARE

## 2020-10-21 PROCEDURE — 72146 MRI CHEST SPINE W/O DYE: CPT

## 2020-11-05 NOTE — PROGRESS NOTES
Patient agreed to COVID test on 11/9 at the  [facility]  located at  [address] [place] [address] Drive the hours of 6 am- 2:30 pm, instructed to bring ID. Patient instructed to self isolate until day of surgery.

## 2020-11-09 ENCOUNTER — HOSPITAL ENCOUNTER (OUTPATIENT)
Age: 70
Discharge: HOME | End: 2020-11-11
Payer: MEDICARE

## 2020-11-09 PROCEDURE — U0003 INFECTIOUS AGENT DETECTION BY NUCLEIC ACID (DNA OR RNA); SEVERE ACUTE RESPIRATORY SYNDROME CORONAVIRUS 2 (SARS-COV-2) (CORONAVIRUS DISEASE [COVID-19]), AMPLIFIED PROBE TECHNIQUE, MAKING USE OF HIGH THROUGHPUT TECHNOLOGIES AS DESCRIBED BY CMS-2020-01-R: HCPCS

## 2020-11-09 RX ORDER — FLUTICASONE FUROATE, UMECLIDINIUM BROMIDE AND VILANTEROL TRIFENATATE 100; 62.5; 25 UG/1; UG/1; UG/1
1 POWDER RESPIRATORY (INHALATION)
COMMUNITY

## 2020-11-09 NOTE — PROGRESS NOTES
you are to spend the night in the hospital.     PARKING INSTRUCTIONS:   [x] Arrival Time:__0530 VIA Everlyn Re DOOR___________  · [] Parking lot '\"I\"  is located on [facility] (the corner of [facility] and [facility]). To enter, press the button and the gate will lift. A free token will be provided to exit the lot. One car per patient is allowed to park in this lot. All other cars are to park on [address] [place] [address] Street either in the parking garage or the handicap lot. [] To reach the [facility] lobby from [address] [place] [address], upon entering the hospital, take elevator B to the 3rd floor. EDUCATION INSTRUCTIONS:      [] Knee or hip replacement booklet & exercise pamphlets given. [] [address] placed in chart. [] Pre-admission Testing educational folder given  [] Incentive Spirometry,coughing & deep breathing exercises reviewed. []Medication information sheet(s)   []Fluoroscopy-Xray used in surgery reviewed with patient. Educational pamphlet placed in chart. []Pain: Post-op pain is normal and to be expected. You will be asked to rate your pain from 0-10(a zero is not acceptable-education is needed). Your post-op pain goal is:  [] Ask your nurse for your pain medication. [] Joint camp offered. [] Joint replacement booklets given. [] Other:___________________________    MEDICATION INSTRUCTIONS:   [x]Bring a complete list of your medications, please write the last time you took the medicine, give this list to the nurse. [x] Take the following medications the morning of surgery with 1-2 ounces of water:   [x] Stop herbal supplements and vitamins 5 days before your surgery. [] DO NOT take any diabetic medicine the morning of surgery. Follow instructions for insulin the day before surgery. [] If you are diabetic and your blood sugar is low or you feel symptomatic, you may drink 1-2 ounces of apple juice or take a glucose tablet.   The morning of your procedure, you may call the pre-op area if you have concerns about your blood sugar [phone]. [x] Use your inhalers the morning of surgery. Bring your emergency inhaler with you day of surgery. [x] Follow physician instructions regarding any blood thinners you may be taking. PLAVIX  WHAT TO EXPECT:  [x] The day of surgery you will be greeted and checked in by the [employer].  In addition, you will be registered in the [facility] by a Patient Access Representative. Please bring your photo ID and insurance card. A nurse will greet you in accordance to the time you are needed in the pre-op area to prepare you for surgery. Please do not be discouraged if you are not greeted in the order you arrive as there are many variables that are involved in patient preparation. Your patience is greatly appreciated as you wait for your nurse. Please bring in items such as: books, magazines, newspapers, electronics, or any other items  to occupy your time in the waiting area. []  Delays may occur with surgery and staff will make a sincere effort to keep you informed of delays. If any delays occur with your procedure, we apologize ahead of time for your inconvenience as we recognize the value of your time.

## 2020-11-12 ENCOUNTER — PREP FOR PROCEDURE (OUTPATIENT)
Dept: ORTHOPEDIC SURGERY | Age: 70
End: 2020-11-12

## 2020-11-12 ENCOUNTER — ANESTHESIA EVENT (OUTPATIENT)
Dept: OPERATING ROOM | Age: 70
End: 2020-11-12
Payer: MEDICARE

## 2020-11-12 DIAGNOSIS — Z01.818 PRE-OP EXAM: Primary | ICD-10-CM

## 2020-11-12 RX ORDER — SODIUM CHLORIDE 0.9 % (FLUSH) 0.9 %
10 SYRINGE (ML) INJECTION EVERY 12 HOURS SCHEDULED
Status: CANCELLED | OUTPATIENT
Start: 2020-11-12

## 2020-11-12 RX ORDER — SODIUM CHLORIDE 0.9 % (FLUSH) 0.9 %
10 SYRINGE (ML) INJECTION PRN
Status: CANCELLED | OUTPATIENT
Start: 2020-11-12

## 2020-11-13 ENCOUNTER — APPOINTMENT (OUTPATIENT)
Dept: GENERAL RADIOLOGY | Age: 70
End: 2020-11-13
Attending: ORTHOPAEDIC SURGERY
Payer: MEDICARE

## 2020-11-13 ENCOUNTER — ANESTHESIA (OUTPATIENT)
Dept: OPERATING ROOM | Age: 70
End: 2020-11-13
Payer: MEDICARE

## 2020-11-13 ENCOUNTER — HOSPITAL ENCOUNTER (OUTPATIENT)
Age: 70
Setting detail: OUTPATIENT SURGERY
Discharge: HOME | End: 2020-11-13
Attending: ORTHOPAEDIC SURGERY | Admitting: ORTHOPAEDIC SURGERY
Payer: MEDICARE

## 2020-11-13 VITALS
WEIGHT: 129 LBS | RESPIRATION RATE: 21 BRPM | HEART RATE: 86 BPM | TEMPERATURE: 97.2 F | SYSTOLIC BLOOD PRESSURE: 152 MMHG | OXYGEN SATURATION: 92 % | DIASTOLIC BLOOD PRESSURE: 60 MMHG | BODY MASS INDEX: 22.86 KG/M2 | HEIGHT: 63 IN

## 2020-11-13 VITALS — DIASTOLIC BLOOD PRESSURE: 78 MMHG | SYSTOLIC BLOOD PRESSURE: 136 MMHG | OXYGEN SATURATION: 100 %

## 2020-11-13 LAB
SARS-COV-2, NAAT: ABNORMAL
SARS-COV-2, NAAT: NOT DETECTED
SARS-COV-2: NOT DETECTED
SOURCE: NORMAL

## 2020-11-13 PROCEDURE — 7100000011 HC PHASE II RECOVERY - ADDTL 15 MIN: Performed by: ORTHOPAEDIC SURGERY

## 2020-11-13 PROCEDURE — U0002 COVID-19 LAB TEST NON-CDC: HCPCS

## 2020-11-13 PROCEDURE — 6360000002 HC RX W HCPCS: Performed by: PHYSICIAN ASSISTANT

## 2020-11-13 PROCEDURE — 7100000000 HC PACU RECOVERY - FIRST 15 MIN: Performed by: ORTHOPAEDIC SURGERY

## 2020-11-13 PROCEDURE — 6360000002 HC RX W HCPCS: Performed by: ANESTHESIOLOGY

## 2020-11-13 PROCEDURE — 3600000004 HC SURGERY LEVEL 4 BASE: Performed by: ORTHOPAEDIC SURGERY

## 2020-11-13 PROCEDURE — 3700000000 HC ANESTHESIA ATTENDED CARE: Performed by: ORTHOPAEDIC SURGERY

## 2020-11-13 PROCEDURE — 6370000000 HC RX 637 (ALT 250 FOR IP): Performed by: ANESTHESIOLOGY

## 2020-11-13 PROCEDURE — 2580000003 HC RX 258

## 2020-11-13 PROCEDURE — 3209999900 FLUORO FOR SURGICAL PROCEDURES

## 2020-11-13 PROCEDURE — 88307 TISSUE EXAM BY PATHOLOGIST: CPT

## 2020-11-13 PROCEDURE — 2500000003 HC RX 250 WO HCPCS

## 2020-11-13 PROCEDURE — 94640 AIRWAY INHALATION TREATMENT: CPT

## 2020-11-13 PROCEDURE — 7100000001 HC PACU RECOVERY - ADDTL 15 MIN: Performed by: ORTHOPAEDIC SURGERY

## 2020-11-13 PROCEDURE — 3600000014 HC SURGERY LEVEL 4 ADDTL 15MIN: Performed by: ORTHOPAEDIC SURGERY

## 2020-11-13 PROCEDURE — 88311 DECALCIFY TISSUE: CPT

## 2020-11-13 PROCEDURE — 3700000001 HC ADD 15 MINUTES (ANESTHESIA): Performed by: ORTHOPAEDIC SURGERY

## 2020-11-13 PROCEDURE — C1894 INTRO/SHEATH, NON-LASER: HCPCS | Performed by: ORTHOPAEDIC SURGERY

## 2020-11-13 PROCEDURE — 6360000002 HC RX W HCPCS

## 2020-11-13 PROCEDURE — 2709999900 HC NON-CHARGEABLE SUPPLY: Performed by: ORTHOPAEDIC SURGERY

## 2020-11-13 PROCEDURE — 7100000010 HC PHASE II RECOVERY - FIRST 15 MIN: Performed by: ORTHOPAEDIC SURGERY

## 2020-11-13 PROCEDURE — C1713 ANCHOR/SCREW BN/BN,TIS/BN: HCPCS | Performed by: ORTHOPAEDIC SURGERY

## 2020-11-13 DEVICE — CEMENT C01A KYPHX HV-R BONE CEMENT EN
Type: IMPLANTABLE DEVICE | Site: BACK | Status: FUNCTIONAL
Brand: KYPHON® HV-R® BONE CEMENT

## 2020-11-13 RX ORDER — PROPOFOL 10 MG/ML
INJECTION, EMULSION INTRAVENOUS PRN
Status: DISCONTINUED | OUTPATIENT
Start: 2020-11-13 | End: 2020-11-13 | Stop reason: SDUPTHER

## 2020-11-13 RX ORDER — HYDROCODONE BITARTRATE AND ACETAMINOPHEN 5; 325 MG/1; MG/1
1 TABLET ORAL
Status: COMPLETED | OUTPATIENT
Start: 2020-11-13 | End: 2020-11-13

## 2020-11-13 RX ORDER — SODIUM CHLORIDE 0.9 % (FLUSH) 0.9 %
10 SYRINGE (ML) INJECTION EVERY 12 HOURS SCHEDULED
Status: DISCONTINUED | OUTPATIENT
Start: 2020-11-13 | End: 2020-11-13 | Stop reason: HOSPADM

## 2020-11-13 RX ORDER — ONDANSETRON 2 MG/ML
4 INJECTION INTRAMUSCULAR; INTRAVENOUS
Status: DISCONTINUED | OUTPATIENT
Start: 2020-11-13 | End: 2020-11-13 | Stop reason: HOSPADM

## 2020-11-13 RX ORDER — HYDROCODONE BITARTRATE AND ACETAMINOPHEN 5; 325 MG/1; MG/1
1 TABLET ORAL EVERY 8 HOURS PRN
Qty: 21 TABLET | Refills: 0 | Status: SHIPPED | OUTPATIENT
Start: 2020-11-13 | End: 2020-11-20

## 2020-11-13 RX ORDER — MORPHINE SULFATE 2 MG/ML
2 INJECTION, SOLUTION INTRAMUSCULAR; INTRAVENOUS EVERY 5 MIN PRN
Status: DISCONTINUED | OUTPATIENT
Start: 2020-11-13 | End: 2020-11-13 | Stop reason: HOSPADM

## 2020-11-13 RX ORDER — GLYCOPYRROLATE 1 MG/5 ML
SYRINGE (ML) INTRAVENOUS PRN
Status: DISCONTINUED | OUTPATIENT
Start: 2020-11-13 | End: 2020-11-13 | Stop reason: SDUPTHER

## 2020-11-13 RX ORDER — LIDOCAINE HYDROCHLORIDE 20 MG/ML
INJECTION, SOLUTION INTRAVENOUS PRN
Status: DISCONTINUED | OUTPATIENT
Start: 2020-11-13 | End: 2020-11-13 | Stop reason: SDUPTHER

## 2020-11-13 RX ORDER — DEXAMETHASONE SODIUM PHOSPHATE 10 MG/ML
INJECTION, SOLUTION INTRAMUSCULAR; INTRAVENOUS PRN
Status: DISCONTINUED | OUTPATIENT
Start: 2020-11-13 | End: 2020-11-13 | Stop reason: SDUPTHER

## 2020-11-13 RX ORDER — NEOSTIGMINE METHYLSULFATE 1 MG/ML
INJECTION, SOLUTION INTRAVENOUS PRN
Status: DISCONTINUED | OUTPATIENT
Start: 2020-11-13 | End: 2020-11-13 | Stop reason: SDUPTHER

## 2020-11-13 RX ORDER — ONDANSETRON 2 MG/ML
INJECTION INTRAMUSCULAR; INTRAVENOUS PRN
Status: DISCONTINUED | OUTPATIENT
Start: 2020-11-13 | End: 2020-11-13 | Stop reason: SDUPTHER

## 2020-11-13 RX ORDER — SODIUM CHLORIDE 0.9 % (FLUSH) 0.9 %
10 SYRINGE (ML) INJECTION PRN
Status: DISCONTINUED | OUTPATIENT
Start: 2020-11-13 | End: 2020-11-13 | Stop reason: HOSPADM

## 2020-11-13 RX ORDER — PROMETHAZINE HYDROCHLORIDE 25 MG/ML
6.25 INJECTION, SOLUTION INTRAMUSCULAR; INTRAVENOUS
Status: DISCONTINUED | OUTPATIENT
Start: 2020-11-13 | End: 2020-11-13 | Stop reason: HOSPADM

## 2020-11-13 RX ORDER — ROCURONIUM BROMIDE 10 MG/ML
INJECTION, SOLUTION INTRAVENOUS PRN
Status: DISCONTINUED | OUTPATIENT
Start: 2020-11-13 | End: 2020-11-13 | Stop reason: SDUPTHER

## 2020-11-13 RX ORDER — MORPHINE SULFATE 2 MG/ML
1 INJECTION, SOLUTION INTRAMUSCULAR; INTRAVENOUS EVERY 5 MIN PRN
Status: DISCONTINUED | OUTPATIENT
Start: 2020-11-13 | End: 2020-11-13 | Stop reason: HOSPADM

## 2020-11-13 RX ORDER — IPRATROPIUM BROMIDE AND ALBUTEROL SULFATE 2.5; .5 MG/3ML; MG/3ML
1 SOLUTION RESPIRATORY (INHALATION) ONCE
Status: DISCONTINUED | OUTPATIENT
Start: 2020-11-13 | End: 2020-11-13 | Stop reason: SDUPTHER

## 2020-11-13 RX ORDER — FENTANYL CITRATE 50 UG/ML
INJECTION, SOLUTION INTRAMUSCULAR; INTRAVENOUS PRN
Status: DISCONTINUED | OUTPATIENT
Start: 2020-11-13 | End: 2020-11-13 | Stop reason: SDUPTHER

## 2020-11-13 RX ORDER — IPRATROPIUM BROMIDE AND ALBUTEROL SULFATE 2.5; .5 MG/3ML; MG/3ML
1 SOLUTION RESPIRATORY (INHALATION) ONCE
Status: COMPLETED | OUTPATIENT
Start: 2020-11-13 | End: 2020-11-13

## 2020-11-13 RX ORDER — SODIUM CHLORIDE 9 MG/ML
INJECTION, SOLUTION INTRAVENOUS CONTINUOUS PRN
Status: DISCONTINUED | OUTPATIENT
Start: 2020-11-13 | End: 2020-11-13 | Stop reason: SDUPTHER

## 2020-11-13 RX ADMIN — ONDANSETRON HYDROCHLORIDE 4 MG: 2 INJECTION, SOLUTION INTRAMUSCULAR; INTRAVENOUS at 08:20

## 2020-11-13 RX ADMIN — DEXAMETHASONE SODIUM PHOSPHATE 10 MG: 10 INJECTION, SOLUTION INTRAMUSCULAR; INTRAVENOUS at 08:20

## 2020-11-13 RX ADMIN — Medication 3 MG: at 09:07

## 2020-11-13 RX ADMIN — SODIUM CHLORIDE: 9 INJECTION, SOLUTION INTRAVENOUS at 08:11

## 2020-11-13 RX ADMIN — LIDOCAINE HYDROCHLORIDE 60 MG: 20 INJECTION, SOLUTION INTRAVENOUS at 08:20

## 2020-11-13 RX ADMIN — ROCURONIUM BROMIDE 25 MG: 10 INJECTION, SOLUTION INTRAVENOUS at 08:20

## 2020-11-13 RX ADMIN — Medication 2 G: at 08:29

## 2020-11-13 RX ADMIN — MORPHINE SULFATE 2 MG: 2 INJECTION, SOLUTION INTRAMUSCULAR; INTRAVENOUS at 09:45

## 2020-11-13 RX ADMIN — FENTANYL CITRATE 50 MCG: 50 INJECTION, SOLUTION INTRAMUSCULAR; INTRAVENOUS at 08:20

## 2020-11-13 RX ADMIN — PROPOFOL 100 MG: 10 INJECTION, EMULSION INTRAVENOUS at 08:20

## 2020-11-13 RX ADMIN — FENTANYL CITRATE 50 MCG: 50 INJECTION, SOLUTION INTRAMUSCULAR; INTRAVENOUS at 08:41

## 2020-11-13 RX ADMIN — MORPHINE SULFATE 2 MG: 2 INJECTION, SOLUTION INTRAMUSCULAR; INTRAVENOUS at 10:14

## 2020-11-13 RX ADMIN — ROCURONIUM BROMIDE 25 MG: 10 INJECTION, SOLUTION INTRAVENOUS at 08:29

## 2020-11-13 RX ADMIN — HYDROCODONE BITARTRATE AND ACETAMINOPHEN 1 TABLET: 5; 325 TABLET ORAL at 11:29

## 2020-11-13 RX ADMIN — Medication 0.6 MG: at 09:07

## 2020-11-13 RX ADMIN — IPRATROPIUM BROMIDE AND ALBUTEROL SULFATE 1 AMPULE: .5; 2.5 SOLUTION RESPIRATORY (INHALATION) at 10:59

## 2020-11-13 ASSESSMENT — PULMONARY FUNCTION TESTS
PIF_VALUE: 19
PIF_VALUE: 28
PIF_VALUE: 18
PIF_VALUE: 19
PIF_VALUE: 18
PIF_VALUE: 25
PIF_VALUE: 34
PIF_VALUE: 17
PIF_VALUE: 18
PIF_VALUE: 19
PIF_VALUE: 2
PIF_VALUE: 17
PIF_VALUE: 19
PIF_VALUE: 19
PIF_VALUE: 18
PIF_VALUE: 19
PIF_VALUE: 20
PIF_VALUE: 1
PIF_VALUE: 15
PIF_VALUE: 2
PIF_VALUE: 19
PIF_VALUE: 19
PIF_VALUE: 2
PIF_VALUE: 19
PIF_VALUE: 18
PIF_VALUE: 2
PIF_VALUE: 18
PIF_VALUE: 25
PIF_VALUE: 0
PIF_VALUE: 17
PIF_VALUE: 19
PIF_VALUE: 18
PIF_VALUE: 19
PIF_VALUE: 18
PIF_VALUE: 18
PIF_VALUE: 17
PIF_VALUE: 17
PIF_VALUE: 18
PIF_VALUE: 0
PIF_VALUE: 2
PIF_VALUE: 18
PIF_VALUE: 19
PIF_VALUE: 23
PIF_VALUE: 24
PIF_VALUE: 1
PIF_VALUE: 19
PIF_VALUE: 2
PIF_VALUE: 16
PIF_VALUE: 2
PIF_VALUE: 2
PIF_VALUE: 34
PIF_VALUE: 2
PIF_VALUE: 1
PIF_VALUE: 17
PIF_VALUE: 19
PIF_VALUE: 20
PIF_VALUE: 19
PIF_VALUE: 19

## 2020-11-13 ASSESSMENT — PAIN DESCRIPTION - PAIN TYPE
TYPE: SURGICAL PAIN

## 2020-11-13 ASSESSMENT — PAIN DESCRIPTION - PROGRESSION
CLINICAL_PROGRESSION: GRADUALLY IMPROVING
CLINICAL_PROGRESSION: GRADUALLY IMPROVING
CLINICAL_PROGRESSION: GRADUALLY WORSENING
CLINICAL_PROGRESSION: NOT CHANGED
CLINICAL_PROGRESSION: GRADUALLY IMPROVING
CLINICAL_PROGRESSION: GRADUALLY WORSENING

## 2020-11-13 ASSESSMENT — PAIN DESCRIPTION - ONSET
ONSET: ON-GOING

## 2020-11-13 ASSESSMENT — PAIN SCALES - GENERAL
PAINLEVEL_OUTOF10: 5
PAINLEVEL_OUTOF10: 5
PAINLEVEL_OUTOF10: 7
PAINLEVEL_OUTOF10: 4
PAINLEVEL_OUTOF10: 7
PAINLEVEL_OUTOF10: 0
PAINLEVEL_OUTOF10: 6

## 2020-11-13 ASSESSMENT — PAIN DESCRIPTION - LOCATION
LOCATION: BACK

## 2020-11-13 ASSESSMENT — PAIN DESCRIPTION - ORIENTATION
ORIENTATION: UPPER

## 2020-11-13 ASSESSMENT — PAIN DESCRIPTION - DESCRIPTORS
DESCRIPTORS: ACHING;DISCOMFORT

## 2020-11-13 ASSESSMENT — PAIN DESCRIPTION - FREQUENCY
FREQUENCY: INTERMITTENT

## 2020-11-13 ASSESSMENT — PAIN - FUNCTIONAL ASSESSMENT: PAIN_FUNCTIONAL_ASSESSMENT: 0-10

## 2020-11-13 NOTE — BRIEF OP NOTE
Brief Postoperative Note      Patient: Marc Renteria  YOB: 1950  MRN: [id number]    Date of Procedure: 11/13/2020    Pre-Op Diagnosis: COMPRESSION FX    Post-Op Diagnosis: Same       Procedure(s):  KYPHOPLASTY T7, T8    Surgeon(s):  Adrián Elam DO    Assistant:  Resident: Marito Nuñez DO    Anesthesia: General    Estimated Blood Loss (mL): Minimal    Complications: None    Specimens:   ID Type Source Tests Collected by Time Destination   A : THORACIC 7 AND 8 BONE BIOPSY Tissue Tissue SURGICAL PATHOLOGY Adrián Elam DO 11/13/2020 0902        Implants:  Implant Name Type Inv.  Item Serial No.  Lot No. LRB No. Used Action   CEMENT BNE FULL DOSE HI VISC RADPQ W/O ANTIBIO FOR  CEMENT BNE FULL DOSE HI VISC RADPQ W/O ANTIBIO FOR  [employer] [facility] [id number] N/A 1 Implanted         Drains: * No LDAs found *    Findings: See operative note    Electronically signed by Marito Nuñez DO on 11/13/2020 at 9:16 AM

## 2020-11-13 NOTE — OP NOTE
[address]                  [address] [facility]                                OPERATIVE REPORT    PATIENT NAME: Anabell Brown                :        1950  MED REC NO:   [id number]                            ROOM:  ACCOUNT NO:   [de-identified]                           ADMIT DATE: 2020  PROVIDER:     Angeli Devine DO    DATE OF PROCEDURE:  2020    PREOPERATIVE DIAGNOSES:  1. Traumatic osteoporotic compression fracture, T7.  2.  Compression fracture, T8.  3.  Osteoporosis. 4.  Severe back pain. 5.  Acquired kyphosis. POSTOPERATIVE DIAGNOSES:  1. Traumatic osteoporotic compression fracture, T7.  2.  Compression fracture of T8.  3.  Osteoporosis. 4.  Severe back pain. 5.  Acquired kyphosis. OPERATIVE PROCEDURE:  1. Kyphoplasty at T7.  2.  Kyphoplasty at T8.  3.  Bone biopsy T7 and T8.  4.  C-arm. SURGEON:  Angeli Devine DO    FIRST ASSISTANT:  Jaquan Kaufman DO    COUNTS:  Sponge count and needle count correct. ANESTHESIA:  General endotracheal anesthesia. DISPOSITION:  Stable condition. BLOOD LOSS:  5 mL. COMPLICATIONS:  None. DESCRIPTION OF PROCEDURE:  After thorough consent was obtained from the  patient, the patient understanding the risks of the procedure, the  patient was brought to the operating room and underwent successful  general endotracheal anesthesia without difficulty. She was then  carefully rolled to the prone position on the operating table. All bony  prominences and vital neurovascular structures were well padded. Head  was placed in a cradle richards to avoid any pressure upon the globes of  eyes. Mid to lower back was then sterilely prepped with ChloraPrep  solution and sterilely draped in normal fashion. C-arm control was then  brought in to confirm appropriate level for the skin incision. Marking pen was used to johnnie over the pedicles at T7 bilaterally. A  15-blade was used make a skin incision bilaterally. Jamshidi needle was  then advanced through C-arm control through the pedicle into the  anterior vertebral body of T7 bilaterally. Inner stylet of the Jamshidi  needle was then withdrawn. Guide pin was then placed through the  Jamshidi needle into the anterior vertebral body at T7.  Jamshidi needle  was then withdrawn. Working drill and working cannula were then placed  over top of the guide pin, was advanced under C-arm control through the  pedicle into the anterior vertebral body of T7 bilaterally. Guide pin  and working drill were then withdrawn. Bone biopsy trocar was then  placed and positioned. Core biopsy was then taken at this time, sent to  Pathology for evaluation. Two inflatable bone tamps were then placed  through the working cannula into the anterior vertebral body of T7. Gentle insufflation was then performed at this time to reduce the  fracture site. Cement was then mixed per the 's  specification. Two inflatable bone tamps were then deflated and  withdrawn. Cement reducers were then placed in position. Cement was  injected under C-arm control. Once the cement was hardened, the working  cannula and cement introducers were then withdrawn. Marking pen was used to johnnie over the pedicles at T8 bilaterally. A  15-blade was used make a skin incision bilaterally. Jamshidi needle was  then advanced through C-arm control through the pedicle into the  anterior vertebral body of T8 bilaterally. Inner stylet of the Jamshidi  needle was then withdrawn. Guide pin was then placed through the  Jamshidi needle into the anterior vertebral body at T8.  Jamshidi needle  was then withdrawn. Working drill and working cannula were then placed  over top of the guide pin, was advanced under C-arm control through the  pedicle into the anterior vertebral body of T8 bilaterally. Guide pin  and working drill were then withdrawn.   Bone biopsy trocar was then  placed and positioned. Core biopsy was then taken at this time, sent to  Pathology for evaluation. Two inflatable bone tamps were then placed  through the working cannula into the anterior vertebral body of T8. Gentle insufflation was then performed at this time to reduce the  fracture site. Cement was then mixed per the 's  specification. Two inflatable bone tamps were then deflated and  withdrawn. Cement reducers were then placed in position. Cement was  injected under C-arm control. Once the cement was hardened, the working  cannula and cement introducers were then withdrawn. After kyphoplasties were completed at T7-T8, the wound was then cleansed  and dried. Tincture was applied. Steri-Strips were applied. Sterile  dressing was then applied. The patient was then carefully rolled to the  supine position on the hospital bed. She was awakened from general  anesthesia without difficulty. She was then transferred to PACU per  Anesthesia in stable condition. GROSS HISTORY:  The patient is a 49-year-old female with significant  pain and discomfort. She has significant back pain related to  compression fractures. MRI scan shows subacute fractures with acquired  kyphosis. The patient underwent the above operative procedure with  risks and benefits discussed at length. There were no guarantees given. Consent forms were signed. Also noted the patient had some healing. I  do not think we are going to be able to restore the complete kyphotic  deformity from this.         Janie Tobar DO    D: 11/13/2020 9:24:53       T: 11/13/2020 9:34:48     [email or web address]  Job#: [id number]     Doc#: [id number]    CC:

## 2020-11-13 NOTE — ANESTHESIA POSTPROCEDURE EVALUATION
Department of Anesthesiology  Postprocedure Note    Patient: Guillermo Lentz  MRN: [id number]  YOB: 1950  Date of evaluation: 11/13/2020  Time:  2:37 PM     Procedure Summary     Date:  11/13/20 Room / Location:  [address] [place] [address] / [facility]    Anesthesia Start:  4308 Anesthesia Stop:  6197    Procedure:  KYPHOPLASTY T7, T8 (N/A Back) Diagnosis:  (COMPRESSION FX)    Surgeon:  lAec Sharp DO Responsible Provider:  Christine Murillo DO    Anesthesia Type:  general ASA Status:  3          Anesthesia Type: general    Anne Phase I: Anne Score: 10    Anne Phase II: Anne Score: 10    Last vitals: Reviewed and per EMR flowsheets.        Anesthesia Post Evaluation    Patient location during evaluation: PACU  Patient participation: complete - patient participated  Level of consciousness: awake and alert  Pain score: 1  Airway patency: patent  Nausea & Vomiting: no nausea and no vomiting  Complications: no  Cardiovascular status: hemodynamically stable  Respiratory status: acceptable  Hydration status: euvolemic

## 2020-11-13 NOTE — ANESTHESIA PRE PROCEDURE
Department of Anesthesiology  Preprocedure Note       Name:  Nimco Andrade   Age:  65 y.o.  :  1950                                          MRN:  [id number]         Date:  2020      Surgeon: Esther Fletcher):  Yahir Adorno DO    Procedure: Procedure(s):  KYPHOPLASTY T7, T8    Medications prior to admission:   Prior to Admission medications    Medication Sig Start Date End Date Taking? Authorizing Provider   fluticasone-umeclidin-vilant (TRELEGY ELLIPTA) 100-62.5-25 MCG/INH AEPB Inhale 1 puff into the lungs Daily with lunch BRING   Yes Historical Provider, MD   metoprolol succinate (TOPROL XL) 25 MG extended release tablet Take 1 tablet by mouth daily 18  Yes Adilia Burks MD   albuterol sulfate  (90 Base) MCG/ACT inhaler Inhale 2 puffs into the lungs every 6 hours as needed for Wheezing   Yes Historical Provider, MD   calcium carbonate-vitamin D (CALTRATE) 600-400 MG-UNIT TABS per tab Take 1 tablet by mouth daily   Yes Historical Provider, MD   loratadine (CLARITIN) 10 MG tablet Take 10 mg by mouth daily as needed    Yes Historical Provider, MD   gabapentin (NEURONTIN) 300 MG capsule Take 300 mg by mouth 2 times daily. .   Yes Historical Provider, MD   vitamin E 400 UNIT capsule Take 400 Units by mouth daily. Yes Historical Provider, MD   pyridoxine (B-6) 100 MG tablet Take 100 mg by mouth daily. Yes Historical Provider, MD   Ascorbic Acid (VITAMIN C) 250 MG tablet Take 1,000 mg by mouth daily. Yes Historical Provider, MD   clopidogrel (PLAVIX) 75 MG tablet Take 75 mg by mouth daily. Yes Historical Provider, MD   lisinopril (PRINIVIL;ZESTRIL) 40 MG tablet Take 40 mg by mouth daily. Yes Historical Provider, MD   lovastatin (MEVACOR) 40 MG tablet Take 40 mg by mouth nightly. Yes Historical Provider, MD   ferrous sulfate 325 (65 FE) MG tablet Take 325 mg by mouth daily (with breakfast)    Yes Historical Provider, MD   BABY ASPIRIN PO Take 1 tablet by mouth daily. 81 mg   Yes Historical Provider, MD   vitamin D (CHOLECALCIFEROL) 1000 UNIT TABS tablet Take 1,000 Units by mouth daily. Yes Historical Provider, MD   Cyanocobalamin (VITAMIN B 12) 250 MCG LOZG Take 2,500 mcg by mouth 2 times daily.    Yes Historical Provider, MD       Current medications:    Current Facility-Administered Medications   Medication Dose Route Frequency Provider Last Rate Last Dose    ceFAZolin (ANCEF) 2 g in sterile water 20 mL IV syringe  2 g Intravenous On Call to [address] [place], [place]        sodium chloride flush 0.9 % injection 10 mL  10 mL Intravenous 2 times per day KADI Zhao        sodium chloride flush 0.9 % injection 10 mL  10 mL Intravenous PRN KADI Zhao           Allergies:  No Known Allergies    Problem List:    Patient Active Problem List   Diagnosis Code    Chest pain R07.9    Atypical chest pain R07.89    Essential hypertension I10    Mixed hyperlipidemia E78.2    Chronic obstructive pulmonary disease ([address] J44.9    Subcutaneous emphysema resulting from a procedure T81.82XA    Ex-smoker for more than 1 year Z87.891    Uterine carcinoma ([address] C55       Past Medical History:        Diagnosis Date    Anemia     Blood transfusion 2004    16 units plus BLEEDING ULCER    Cancer ([address] 1971    uterus    Chronic obstructive pulmonary disease ([address]     Hx of blood clots     LEG ON PLAVIX    Hyperlipidemia     Hypertension     PVD (peripheral vascular disease) ([address]        Past Surgical History:        Procedure Laterality Date    ABDOMEN SURGERY      repair bleeding ulcer    APPENDECTOMY      CARDIAC CATHETERIZATION  07/18/2018    Dr. Anahi Collazo  2005    6 inches colon removed    COLONOSCOPY      ENDOSCOPY, COLON, DIAGNOSTIC      HYSTERECTOMY      OTHER SURGICAL HISTORY  2004    blood clot removed from rt. leg x 2     TONGUE BIOPSY      x 2    TRANSLUMINAL ANGIOPLASTY Right 4/15/15    stent Rt  distal SFA    VASCULAR SURGERY  2009    stent rt. leg    VASCULAR SURGERY  2014    stent rt leg       Social History:    Social History     Tobacco Use    Smoking status: Former Smoker     Packs/day: 2.00     Years: 30.00     Pack years: 60.00     Last attempt to quit: 2004     Years since quittin.8    Smokeless tobacco: Never Used    Tobacco comment: STOP    Substance Use Topics    Alcohol use: Yes     Comment: occas beer,wine                                Counseling given: Not Answered  Comment: STOP       Vital Signs (Current):   Vitals:    20 1309 20 0613   BP:  (!) 197/92   Pulse:  74   Resp:  20   Temp:  36.6 °C (97.8 °F)   TempSrc:  Temporal   SpO2:  93%   Weight: 129 lb (58.5 kg) 129 lb (58.5 kg)   Height: 5' 3\" (1.6 m) 5' 3\" (1.6 m)                                              BP Readings from Last 3 Encounters:   20 (!) 197/92   18 135/67   18 (!) 156/70       NPO Status: Time of last liquid consumption: 0400                        Time of last solid consumption: 1645                        Date of last liquid consumption: 20                        Date of last solid food consumption: 20    BMI:   Wt Readings from Last 3 Encounters:   20 129 lb (58.5 kg)   18 136 lb 3.2 oz (61.8 kg)   18 140 lb (63.5 kg)     Body mass index is 22.85 kg/m².     CBC:   Lab Results   Component Value Date    WBC 7.8 2018    RBC 4.50 2018    HGB 14.4 2018    HCT 41.2 2018    MCV 91.6 2018    RDW 12.3 2018     2018       CMP:   Lab Results   Component Value Date     2018    K 4.0 2018    CL 99 2018    CO2 26 2018    BUN 8 2018    CREATININE 0.8 2018    GFRAA >60 2018    LABGLOM >60 2018    GLUCOSE 115 2018    GLUCOSE 85 2011    PROT 7.3 2018    CALCIUM 10.3 2018    BILITOT 0.5 2018    ALKPHOS 79 2018    AST 23 2018    ALT 20 07/16/2018       POC Tests: No results for input(s): POCGLU, POCNA, POCK, POCCL, POCBUN, POCHEMO, POCHCT in the last 72 hours. Coags:   Lab Results   Component Value Date    PROTIME 11.0 07/16/2018    PROTIME 12.3 11/30/2011    INR 1.0 07/16/2018    APTT 28.3 07/16/2018       HCG (If Applicable): No results found for: PREGTESTUR, PREGSERUM, HCG, HCGQUANT     ABGs: No results found for: PHART, PO2ART, LXM1BSX, STR8SNK, BEART, N2NUOOHU     Type & Screen (If Applicable):  No results found for: LABABO, LABRH    Drug/Infectious Status (If Applicable):  No results found for: HIV, HEPCAB    COVID-19 Screening (If Applicable):   Lab Results   Component Value Date    COVID19 Indeterminate 11/13/2020         Anesthesia Evaluation  Patient summary reviewed and Nursing notes reviewed no history of anesthetic complications:   Airway: Mallampati: III  TM distance: >3 FB   Neck ROM: full  Mouth opening: > = 3 FB Dental:    (+) upper dentures and lower dentures      Pulmonary: breath sounds clear to auscultation  (+) COPD:                             Cardiovascular:  Exercise tolerance: good (>4 METS),   (+) hypertension (takes metop):,         Rhythm: regular  Rate: normal           Beta Blocker:  Dose within 24 Hrs         Neuro/Psych:                ROS comment: Compression fx T7 GI/Hepatic/Renal: Neg GI/Hepatic/Renal ROS            Endo/Other: Negative Endo/Other ROS                    Abdominal:           Vascular:   + DVT, .        ROS comment: hisory of DVT not present currently. Anesthesia Plan      general     ASA 3       Induction: intravenous. Anesthetic plan and risks discussed with patient and child/children. Use of blood products discussed with patient and child/children whom consented to blood products. Plan discussed with CRNA and attending. [facility], RN   11/13/2020      Patient seen and examined, chart reviewed, agree with above findings.   Anesthetic plan, risks, benefits, alternatives, and personnel involved discussed with patient and her daughter. Patient verbalized an understanding and agreed to proceed. NPO status confirmed. Anesthetic plan discussed with care team members and agreed upon.     Tyler Mccarthy DO   11/13/2020  8:01 AM

## 2020-11-22 NOTE — H&P
Department of Orthopedic Trauma Surgery  Office History & Physical Exam      CHIEF COMPLAINT: Back Pain, compression fractures. HISTORY OF PRESENT ILLNESS:                The patient is a 65 y.o. female who presents with back pain and acute compression fractures. She states the back pain has become very bothersome. It is tender to touch. She presents with imaging completed. Past Medical History:        Diagnosis Date    Anemia     Blood transfusion 2004    16 units plus BLEEDING ULCER    [facility] 1971    uterus    Chronic obstructive pulmonary disease (HCC)     Hx of blood clots     LEG ON PLAVIX    Hyperlipidemia     Hypertension     PVD (peripheral vascular disease) ([address]      Past Surgical History:        Procedure Laterality Date    ABDOMEN SURGERY      repair bleeding ulcer    APPENDECTOMY      CARDIAC CATHETERIZATION  07/18/2018    Dr. Isis Jeter  2005    6 inches colon removed    COLONOSCOPY      ENDOSCOPY, COLON, DIAGNOSTIC      HYSTERECTOMY      OTHER SURGICAL HISTORY  2004    blood clot removed from rt. leg x 2     SPINE SURGERY N/A 11/13/2020    KYPHOPLASTY T7, T8 performed by Bradley Vieyra DO at [address]      x 2    TRANSLUMINAL ANGIOPLASTY Right 4/15/15    stent Rt  distal SFA    VASCULAR SURGERY  2009    stent rt. leg    VASCULAR SURGERY  2014    stent rt leg     Current Medications:   No current facility-administered medications for this encounter. Allergies:  Patient has no known allergies. Social History:   TOBACCO:   reports that she quit smoking about 16 years ago. She has a 60.00 pack-year smoking history. She has never used smokeless tobacco.  ETOH:   reports current alcohol use. DRUGS:   reports no history of drug use. ACTIVITIES OF DAILY LIVING:    OCCUPATION:    Family History:   No family history on file.     REVIEW OF SYSTEMS:   Skin: no abnormal pigmentation, rash  Eyes: no blurring or eye pain   Ears/Nose/Throat: no hearing loss, tinnitus  Respiratory: No increased work of breathing, no coughing  Cardiovascular: Brisk capillary refill bilaterally, well perfused extremities  Gastrointestinal: no nausea, vomiting  Neurologic: no paralysis, or seizures  Musculoskeletal: Back pain      PHYSICAL EXAM:    VITALS:  BP (!) 152/60   Pulse 86   Temp 97.2 °F (36.2 °C)   Resp 21   Ht 5' 3\" (1.6 m)   Wt 129 lb (58.5 kg)   SpO2 92%   BMI 22.85 kg/m²   Constitutional: Oriented to person, place, and time; Answer questions appropriately  HENT: Head: Normocephalic and atraumatic. Eyes: EOMI, THANIA  Neck: Supple, trachea midline  Cardiovascular: no pitting edema, no jvd  Pulmonary/Chest: No increased work of breathing, no cough  Abdominal: Non-tender, non-distended  Neurologic:  Awake, alert and oriented in three planes. No gross deficits   Musculoskeletal:  No open lesions or rashes noted. There is TTP about the thoracic and lumbar spine  She has pain with any range motion in her mid back  Sensation and motor is intact to distal extremities. No upper motor neuron signs on examination. DATA:    CBC:   Lab Results   Component Value Date    WBC 7.8 07/16/2018    RBC 4.50 07/16/2018    HGB 14.4 07/16/2018    HCT 41.2 07/16/2018    MCV 91.6 07/16/2018    MCH 32.0 07/16/2018    MCHC 35.0 07/16/2018    RDW 12.3 07/16/2018     07/16/2018    MPV 10.0 07/16/2018     Radiology Review:   Acute compressing fractures of T7 and T8. There is increased kyphosis of her thoracic spine. T8 is near 80% height loss. IMPRESSION:  Compression fractures of T7, T8  Back pain, intractable    PLAN:  1. We discussed Ms. Epperson's back pain. There is concern that her back pain is due to her compression fractures located at T7,T8. At this time we recommend kyphoplasty of the above levels. Risks, benefits and expected outcomes have been gone over in detail. Patient agrees to proceed with surgical interventions. No guarantees were given.   2.

## 2022-06-29 ENCOUNTER — HOSPITAL ENCOUNTER (OUTPATIENT)
Dept: GENERAL RADIOLOGY | Age: 72
Discharge: HOME | End: 2022-07-01
Payer: MEDICARE

## 2022-06-29 DIAGNOSIS — Z12.31 VISIT FOR SCREENING MAMMOGRAM: ICD-10-CM

## 2022-06-29 PROCEDURE — 77063 BREAST TOMOSYNTHESIS BI: CPT

## 2023-07-20 ENCOUNTER — HOSPITAL ENCOUNTER (OUTPATIENT)
Dept: GENERAL RADIOLOGY | Age: 73
Discharge: HOME | End: 2023-07-22
Payer: MEDICARE

## 2023-07-20 DIAGNOSIS — M81.0 AGE-RELATED OSTEOPOROSIS WITHOUT CURRENT PATHOLOGICAL FRACTURE: ICD-10-CM

## 2023-07-20 DIAGNOSIS — Z12.31 ENCOUNTER FOR SCREENING MAMMOGRAM FOR MALIGNANT NEOPLASM OF BREAST: ICD-10-CM

## 2023-07-20 PROCEDURE — 77080 DXA BONE DENSITY AXIAL: CPT

## 2023-07-20 PROCEDURE — 77063 BREAST TOMOSYNTHESIS BI: CPT

## 2024-08-07 ENCOUNTER — HOSPITAL ENCOUNTER (OUTPATIENT)
Dept: PULMONOLOGY | Age: 74
Discharge: HOME | End: 2024-08-07
Payer: MEDICARE

## 2024-08-07 DIAGNOSIS — J44.1 CHRONIC OBSTRUCTIVE PULMONARY DISEASE WITH (ACUTE) EXACERBATION (HCC): ICD-10-CM

## 2024-08-07 PROCEDURE — 94729 DIFFUSING CAPACITY: CPT

## 2024-08-07 PROCEDURE — 94060 EVALUATION OF WHEEZING: CPT

## 2024-08-07 PROCEDURE — 94726 PLETHYSMOGRAPHY LUNG VOLUMES: CPT

## 2024-08-08 ENCOUNTER — HOSPITAL ENCOUNTER (OUTPATIENT)
Dept: GENERAL RADIOLOGY | Age: 74
Discharge: HOME | End: 2024-08-10
Payer: MEDICARE

## 2024-08-08 VITALS — HEIGHT: 63 IN | BODY MASS INDEX: 25.52 KG/M2 | WEIGHT: 144 LBS

## 2024-08-08 DIAGNOSIS — Z12.31 VISIT FOR SCREENING MAMMOGRAM: ICD-10-CM

## 2024-08-08 PROCEDURE — 77063 BREAST TOMOSYNTHESIS BI: CPT

## 2024-08-08 NOTE — PROCEDURES
[address] [address]                           PULMONARY FUNCTION      PATIENT NAME: REBECCA CARRION          : 1950  MED REC NO: [id number]                        ROOM:   ACCOUNT NO: [id number]                       ADMIT DATE: 2024  PROVIDER: Kody Fontenot MD      DATE OF PROCEDURE: 2024    DIAGNOSIS:  Chronic obstructive pulmonary disease, J44.1.    FINDINGS:  This is a full PFT of good quality.  Spirometry revealed moderately reduced FVC and FEV1 with reduced FEV1/FVC ratio.  There was no significant bronchodilator response for either the FEV1 or FVC.  The best FEV1 was a post-bronchodilator value of 0.68 L (34% of predicted).    Lung volumes revealed normal total lung capacity with moderately increased thoracic gas volume and residual volume.  RV/TLC ratio was increased.    Diffusion capacity was severely reduced and remained severely reduced after correction for alveolar volume.    Airway resistance was mildly increased.    IMPRESSION:  Moderate (by Z-score) obstructive ventilatory defect without bronchodilator response.  Lung volumes show air trapping, and there is a severe diffusion capacity impairment.  These findings are consistent with moderate to severe emphysema.          KODY FONTENOT MD      D:  2024 08:11:39     T:  2024 13:09:24     FARNCES/REE  Job #:  [id number]     Doc#:  [id number]    CC:   Bethany Martino CNP

## 2024-11-25 ENCOUNTER — APPOINTMENT (OUTPATIENT)
Dept: CT IMAGING | Age: 74
DRG: 378 | End: 2024-11-25
Payer: MEDICARE

## 2024-11-25 ENCOUNTER — APPOINTMENT (OUTPATIENT)
Dept: GENERAL RADIOLOGY | Age: 74
DRG: 378 | End: 2024-11-25
Payer: MEDICARE

## 2024-11-25 ENCOUNTER — HOSPITAL ENCOUNTER (INPATIENT)
Age: 74
LOS: 5 days | Discharge: HOME | DRG: 378 | End: 2024-12-01
Attending: EMERGENCY MEDICINE | Admitting: FAMILY MEDICINE
Payer: MEDICARE

## 2024-11-25 DIAGNOSIS — D64.9 ANEMIA, UNSPECIFIED TYPE: ICD-10-CM

## 2024-11-25 DIAGNOSIS — R20.0 NUMBNESS AND TINGLING OF RIGHT LEG: ICD-10-CM

## 2024-11-25 DIAGNOSIS — R20.2 NUMBNESS AND TINGLING OF RIGHT LEG: ICD-10-CM

## 2024-11-25 DIAGNOSIS — R20.2 PARESTHESIA: ICD-10-CM

## 2024-11-25 DIAGNOSIS — N17.9 ACUTE RENAL FAILURE, UNSPECIFIED ACUTE RENAL FAILURE TYPE (HCC): ICD-10-CM

## 2024-11-25 DIAGNOSIS — K92.2 GASTROINTESTINAL HEMORRHAGE, UNSPECIFIED GASTROINTESTINAL HEMORRHAGE TYPE: Primary | ICD-10-CM

## 2024-11-25 DIAGNOSIS — I73.9 PVD (PERIPHERAL VASCULAR DISEASE) (HCC): ICD-10-CM

## 2024-11-25 LAB
ALBUMIN SERPL-MCNC: 4.1 G/DL (ref 3.5–5.2)
ALP SERPL-CCNC: 71 U/L (ref 35–104)
ALT SERPL-CCNC: 17 U/L (ref 0–32)
ANION GAP SERPL CALCULATED.3IONS-SCNC: 7 MMOL/L (ref 7–16)
AST SERPL-CCNC: 23 U/L (ref 0–31)
BASOPHILS # BLD: 0.01 K/UL (ref 0–0.2)
BASOPHILS NFR BLD: 0 % (ref 0–2)
BILIRUB SERPL-MCNC: <0.2 MG/DL (ref 0–1.2)
BNP SERPL-MCNC: 1421 PG/ML (ref 0–450)
BUN SERPL-MCNC: 44 MG/DL (ref 6–23)
CALCIUM SERPL-MCNC: 11.7 MG/DL (ref 8.6–10.2)
CHLORIDE SERPL-SCNC: 92 MMOL/L (ref 98–107)
CO2 SERPL-SCNC: 31 MMOL/L (ref 22–29)
CREAT SERPL-MCNC: 2.1 MG/DL (ref 0.5–1)
EOSINOPHIL # BLD: 0.04 K/UL (ref 0.05–0.5)
EOSINOPHILS RELATIVE PERCENT: 0 % (ref 0–6)
ERYTHROCYTE [DISTWIDTH] IN BLOOD BY AUTOMATED COUNT: 19.1 % (ref 11.5–15)
GFR, ESTIMATED: 24 ML/MIN/1.73M2
GLUCOSE SERPL-MCNC: 111 MG/DL (ref 74–99)
HCT VFR BLD AUTO: 17.1 % (ref 34–48)
HGB BLD-MCNC: 5.4 G/DL (ref 11.5–15.5)
IMM GRANULOCYTES # BLD AUTO: 0.05 K/UL (ref 0–0.58)
IMM GRANULOCYTES NFR BLD: 1 % (ref 0–5)
INR PPP: 1
LYMPHOCYTES NFR BLD: 1.44 K/UL (ref 1.5–4)
LYMPHOCYTES RELATIVE PERCENT: 15 % (ref 20–42)
MAGNESIUM SERPL-MCNC: 2.3 MG/DL (ref 1.6–2.6)
MCH RBC QN AUTO: 32.9 PG (ref 26–35)
MCHC RBC AUTO-ENTMCNC: 31.6 G/DL (ref 32–34.5)
MCV RBC AUTO: 104.3 FL (ref 80–99.9)
MONOCYTES NFR BLD: 0.93 K/UL (ref 0.1–0.95)
MONOCYTES NFR BLD: 10 % (ref 2–12)
NEUTROPHILS NFR BLD: 75 % (ref 43–80)
NEUTS SEG NFR BLD: 7.33 K/UL (ref 1.8–7.3)
PARTIAL THROMBOPLASTIN TIME: 27 SEC (ref 24.5–35.1)
PLATELET # BLD AUTO: 424 K/UL (ref 130–450)
PMV BLD AUTO: 10.2 FL (ref 7–12)
POTASSIUM SERPL-SCNC: 4.3 MMOL/L (ref 3.5–5)
PROT SERPL-MCNC: 6.1 G/DL (ref 6.4–8.3)
PROTHROMBIN TIME: 10.6 SEC (ref 9.3–12.4)
RBC # BLD AUTO: 1.64 M/UL (ref 3.5–5.5)
SODIUM SERPL-SCNC: 130 MMOL/L (ref 132–146)
TROPONIN I SERPL HS-MCNC: 28 NG/L (ref 0–9)
TROPONIN I SERPL HS-MCNC: 29 NG/L (ref 0–9)
WBC OTHER # BLD: 9.8 K/UL (ref 4.5–11.5)

## 2024-11-25 PROCEDURE — 85730 THROMBOPLASTIN TIME PARTIAL: CPT

## 2024-11-25 PROCEDURE — 84484 ASSAY OF TROPONIN QUANT: CPT

## 2024-11-25 PROCEDURE — 99285 EMERGENCY DEPT VISIT HI MDM: CPT

## 2024-11-25 PROCEDURE — 83880 ASSAY OF NATRIURETIC PEPTIDE: CPT

## 2024-11-25 PROCEDURE — 93005 ELECTROCARDIOGRAM TRACING: CPT | Performed by: EMERGENCY MEDICINE

## 2024-11-25 PROCEDURE — 83735 ASSAY OF MAGNESIUM: CPT

## 2024-11-25 PROCEDURE — 86923 COMPATIBILITY TEST ELECTRIC: CPT

## 2024-11-25 PROCEDURE — 86900 BLOOD TYPING SEROLOGIC ABO: CPT

## 2024-11-25 PROCEDURE — 86901 BLOOD TYPING SEROLOGIC RH(D): CPT

## 2024-11-25 PROCEDURE — 80053 COMPREHEN METABOLIC PANEL: CPT

## 2024-11-25 PROCEDURE — 70450 CT HEAD/BRAIN W/O DYE: CPT

## 2024-11-25 PROCEDURE — 85025 COMPLETE CBC W/AUTO DIFF WBC: CPT

## 2024-11-25 PROCEDURE — P9016 RBC LEUKOCYTES REDUCED: HCPCS

## 2024-11-25 PROCEDURE — 85610 PROTHROMBIN TIME: CPT

## 2024-11-25 PROCEDURE — 74176 CT ABD & PELVIS W/O CONTRAST: CPT

## 2024-11-25 PROCEDURE — 71046 X-RAY EXAM CHEST 2 VIEWS: CPT

## 2024-11-25 PROCEDURE — 86850 RBC ANTIBODY SCREEN: CPT

## 2024-11-25 RX ORDER — SODIUM CHLORIDE 9 MG/ML
INJECTION, SOLUTION INTRAVENOUS
Status: DISPENSED
Start: 2024-11-25 | End: 2024-11-26

## 2024-11-25 RX ORDER — SODIUM CHLORIDE 9 MG/ML
INJECTION, SOLUTION INTRAVENOUS PRN
Status: DISCONTINUED | OUTPATIENT
Start: 2024-11-25 | End: 2024-12-01 | Stop reason: HOSPADM

## 2024-11-25 ASSESSMENT — LIFESTYLE VARIABLES
HOW OFTEN DO YOU HAVE A DRINK CONTAINING ALCOHOL: NEVER
HOW MANY STANDARD DRINKS CONTAINING ALCOHOL DO YOU HAVE ON A TYPICAL DAY: PATIENT DOES NOT DRINK

## 2024-11-26 PROBLEM — N17.9 AKI (ACUTE KIDNEY INJURY) (HCC): Status: ACTIVE | Noted: 2024-11-26

## 2024-11-26 PROBLEM — K92.2 GI BLEED: Status: ACTIVE | Noted: 2024-11-26

## 2024-11-26 PROBLEM — I73.9 PVD (PERIPHERAL VASCULAR DISEASE) (HCC): Status: ACTIVE | Noted: 2024-11-26

## 2024-11-26 PROBLEM — J96.11 CHRONIC HYPOXEMIC RESPIRATORY FAILURE: Status: ACTIVE | Noted: 2024-11-26

## 2024-11-26 LAB
HCT VFR BLD AUTO: 19.8 % (ref 34–48)
HCT VFR BLD AUTO: 23.7 % (ref 34–48)
HCT VFR BLD AUTO: 23.9 % (ref 34–48)
HCT VFR BLD AUTO: 23.9 % (ref 34–48)
HGB BLD-MCNC: 6.5 G/DL (ref 11.5–15.5)
HGB BLD-MCNC: 7.7 G/DL (ref 11.5–15.5)
HGB BLD-MCNC: 7.8 G/DL (ref 11.5–15.5)
HGB BLD-MCNC: 8 G/DL (ref 11.5–15.5)

## 2024-11-26 PROCEDURE — 2060000000 HC ICU INTERMEDIATE R&B

## 2024-11-26 PROCEDURE — 2700000000 HC OXYGEN THERAPY PER DAY

## 2024-11-26 PROCEDURE — 99223 1ST HOSP IP/OBS HIGH 75: CPT | Performed by: FAMILY MEDICINE

## 2024-11-26 PROCEDURE — 6360000002 HC RX W HCPCS: Performed by: FAMILY MEDICINE

## 2024-11-26 PROCEDURE — 97530 THERAPEUTIC ACTIVITIES: CPT | Performed by: PHYSICAL THERAPIST

## 2024-11-26 PROCEDURE — 30233N1 TRANSFUSION OF NONAUTOLOGOUS RED BLOOD CELLS INTO PERIPHERAL VEIN, PERCUTANEOUS APPROACH: ICD-10-PCS | Performed by: INTERNAL MEDICINE

## 2024-11-26 PROCEDURE — 6360000002 HC RX W HCPCS: Performed by: HOSPITALIST

## 2024-11-26 PROCEDURE — 2580000003 HC RX 258: Performed by: HOSPITALIST

## 2024-11-26 PROCEDURE — 94640 AIRWAY INHALATION TREATMENT: CPT

## 2024-11-26 PROCEDURE — P9016 RBC LEUKOCYTES REDUCED: HCPCS

## 2024-11-26 PROCEDURE — 36430 TRANSFUSION BLD/BLD COMPNT: CPT

## 2024-11-26 PROCEDURE — 6370000000 HC RX 637 (ALT 250 FOR IP): Performed by: FAMILY MEDICINE

## 2024-11-26 PROCEDURE — 85014 HEMATOCRIT: CPT

## 2024-11-26 PROCEDURE — 36415 COLL VENOUS BLD VENIPUNCTURE: CPT

## 2024-11-26 PROCEDURE — 2580000003 HC RX 258: Performed by: INTERNAL MEDICINE

## 2024-11-26 PROCEDURE — 85018 HEMOGLOBIN: CPT

## 2024-11-26 PROCEDURE — 97161 PT EVAL LOW COMPLEX 20 MIN: CPT | Performed by: PHYSICAL THERAPIST

## 2024-11-26 PROCEDURE — 2580000003 HC RX 258: Performed by: FAMILY MEDICINE

## 2024-11-26 RX ORDER — SODIUM CHLORIDE 0.9 % (FLUSH) 0.9 %
5-40 SYRINGE (ML) INJECTION PRN
Status: DISCONTINUED | OUTPATIENT
Start: 2024-11-26 | End: 2024-12-01 | Stop reason: HOSPADM

## 2024-11-26 RX ORDER — GABAPENTIN 300 MG/1
300 CAPSULE ORAL 2 TIMES DAILY
Status: DISCONTINUED | OUTPATIENT
Start: 2024-11-26 | End: 2024-12-01 | Stop reason: HOSPADM

## 2024-11-26 RX ORDER — BUDESONIDE 0.5 MG/2ML
0.5 INHALANT ORAL
Status: DISCONTINUED | OUTPATIENT
Start: 2024-11-26 | End: 2024-12-01 | Stop reason: HOSPADM

## 2024-11-26 RX ORDER — ASCORBIC ACID 500 MG
1000 TABLET ORAL DAILY
Status: DISCONTINUED | OUTPATIENT
Start: 2024-11-26 | End: 2024-12-01 | Stop reason: HOSPADM

## 2024-11-26 RX ORDER — VITAMIN E 268 MG
400 CAPSULE ORAL DAILY
Status: DISCONTINUED | OUTPATIENT
Start: 2024-11-26 | End: 2024-12-01 | Stop reason: HOSPADM

## 2024-11-26 RX ORDER — LISINOPRIL 20 MG/1
40 TABLET ORAL DAILY
Status: DISCONTINUED | OUTPATIENT
Start: 2024-11-26 | End: 2024-12-01 | Stop reason: HOSPADM

## 2024-11-26 RX ORDER — LANOLIN ALCOHOL/MO/W.PET/CERES
2000 CREAM (GRAM) TOPICAL 2 TIMES DAILY
Status: DISCONTINUED | OUTPATIENT
Start: 2024-11-26 | End: 2024-12-01 | Stop reason: HOSPADM

## 2024-11-26 RX ORDER — METOPROLOL SUCCINATE 25 MG/1
25 TABLET, EXTENDED RELEASE ORAL DAILY
Status: DISCONTINUED | OUTPATIENT
Start: 2024-11-26 | End: 2024-12-01 | Stop reason: HOSPADM

## 2024-11-26 RX ORDER — VITAMIN B COMPLEX
1000 TABLET ORAL DAILY
Status: DISCONTINUED | OUTPATIENT
Start: 2024-11-26 | End: 2024-12-01 | Stop reason: HOSPADM

## 2024-11-26 RX ORDER — SODIUM CHLORIDE 9 MG/ML
INJECTION, SOLUTION INTRAVENOUS PRN
Status: DISCONTINUED | OUTPATIENT
Start: 2024-11-26 | End: 2024-12-01 | Stop reason: HOSPADM

## 2024-11-26 RX ORDER — LANOLIN ALCOHOL/MO/W.PET/CERES
100 CREAM (GRAM) TOPICAL DAILY
Status: DISCONTINUED | OUTPATIENT
Start: 2024-11-26 | End: 2024-12-01 | Stop reason: HOSPADM

## 2024-11-26 RX ORDER — SODIUM CHLORIDE 0.9 % (FLUSH) 0.9 %
5-40 SYRINGE (ML) INJECTION EVERY 12 HOURS SCHEDULED
Status: DISCONTINUED | OUTPATIENT
Start: 2024-11-26 | End: 2024-12-01 | Stop reason: HOSPADM

## 2024-11-26 RX ORDER — ATORVASTATIN CALCIUM 20 MG/1
20 TABLET, FILM COATED ORAL NIGHTLY
Status: DISCONTINUED | OUTPATIENT
Start: 2024-11-26 | End: 2024-12-01 | Stop reason: HOSPADM

## 2024-11-26 RX ORDER — CLOPIDOGREL BISULFATE 75 MG/1
75 TABLET ORAL DAILY
Status: DISCONTINUED | OUTPATIENT
Start: 2024-11-26 | End: 2024-12-01 | Stop reason: HOSPADM

## 2024-11-26 RX ORDER — ARFORMOTEROL TARTRATE 15 UG/2ML
15 SOLUTION RESPIRATORY (INHALATION)
Status: DISCONTINUED | OUTPATIENT
Start: 2024-11-26 | End: 2024-12-01 | Stop reason: HOSPADM

## 2024-11-26 RX ORDER — SODIUM CHLORIDE 9 MG/ML
INJECTION, SOLUTION INTRAVENOUS CONTINUOUS
Status: ACTIVE | OUTPATIENT
Start: 2024-11-26 | End: 2024-11-27

## 2024-11-26 RX ORDER — ACETAMINOPHEN 325 MG/1
650 TABLET ORAL EVERY 6 HOURS PRN
Status: DISCONTINUED | OUTPATIENT
Start: 2024-11-26 | End: 2024-12-01 | Stop reason: HOSPADM

## 2024-11-26 RX ORDER — ASPIRIN 81 MG/1
81 TABLET ORAL DAILY
Status: DISCONTINUED | OUTPATIENT
Start: 2024-11-26 | End: 2024-12-01 | Stop reason: HOSPADM

## 2024-11-26 RX ORDER — ACETAMINOPHEN 650 MG/1
650 SUPPOSITORY RECTAL EVERY 6 HOURS PRN
Status: DISCONTINUED | OUTPATIENT
Start: 2024-11-26 | End: 2024-12-01 | Stop reason: HOSPADM

## 2024-11-26 RX ORDER — IPRATROPIUM BROMIDE AND ALBUTEROL SULFATE 2.5; .5 MG/3ML; MG/3ML
1 SOLUTION RESPIRATORY (INHALATION) EVERY 4 HOURS PRN
Status: DISCONTINUED | OUTPATIENT
Start: 2024-11-26 | End: 2024-12-01 | Stop reason: HOSPADM

## 2024-11-26 RX ORDER — FERROUS SULFATE 325(65) MG
325 TABLET ORAL
Status: DISCONTINUED | OUTPATIENT
Start: 2024-11-26 | End: 2024-12-01 | Stop reason: HOSPADM

## 2024-11-26 RX ADMIN — PYRIDOXINE HCL TAB 50 MG 100 MG: 50 TAB at 08:47

## 2024-11-26 RX ADMIN — ARFORMOTEROL TARTRATE 15 MCG: 15 SOLUTION RESPIRATORY (INHALATION) at 18:22

## 2024-11-26 RX ADMIN — BUDESONIDE INHALATION 500 MCG: 0.5 SUSPENSION RESPIRATORY (INHALATION) at 06:36

## 2024-11-26 RX ADMIN — GABAPENTIN 300 MG: 300 CAPSULE ORAL at 08:48

## 2024-11-26 RX ADMIN — CYANOCOBALAMIN TAB 1000 MCG 2000 MCG: 1000 TAB at 08:47

## 2024-11-26 RX ADMIN — SODIUM CHLORIDE, PRESERVATIVE FREE 10 ML: 5 INJECTION INTRAVENOUS at 04:31

## 2024-11-26 RX ADMIN — ARFORMOTEROL TARTRATE 15 MCG: 15 SOLUTION RESPIRATORY (INHALATION) at 06:36

## 2024-11-26 RX ADMIN — Medication 400 UNITS: at 08:48

## 2024-11-26 RX ADMIN — LISINOPRIL 40 MG: 20 TABLET ORAL at 08:49

## 2024-11-26 RX ADMIN — SODIUM CHLORIDE: 9 INJECTION, SOLUTION INTRAVENOUS at 14:12

## 2024-11-26 RX ADMIN — CYANOCOBALAMIN TAB 1000 MCG 2000 MCG: 1000 TAB at 21:00

## 2024-11-26 RX ADMIN — ATORVASTATIN CALCIUM 20 MG: 20 TABLET, FILM COATED ORAL at 21:00

## 2024-11-26 RX ADMIN — GABAPENTIN 300 MG: 300 CAPSULE ORAL at 21:00

## 2024-11-26 RX ADMIN — Medication 1 TABLET: at 08:48

## 2024-11-26 RX ADMIN — BUDESONIDE INHALATION 500 MCG: 0.5 SUSPENSION RESPIRATORY (INHALATION) at 18:22

## 2024-11-26 RX ADMIN — SODIUM CHLORIDE, PRESERVATIVE FREE 10 ML: 5 INJECTION INTRAVENOUS at 21:00

## 2024-11-26 RX ADMIN — SODIUM CHLORIDE, PRESERVATIVE FREE 10 ML: 5 INJECTION INTRAVENOUS at 08:49

## 2024-11-26 RX ADMIN — PANTOPRAZOLE SODIUM 40 MG: 40 INJECTION, POWDER, FOR SOLUTION INTRAVENOUS at 12:33

## 2024-11-26 RX ADMIN — Medication 1000 UNITS: at 08:47

## 2024-11-26 RX ADMIN — METOPROLOL SUCCINATE 25 MG: 25 TABLET, EXTENDED RELEASE ORAL at 08:47

## 2024-11-26 RX ADMIN — IPRATROPIUM BROMIDE AND ALBUTEROL SULFATE 1 DOSE: .5; 2.5 SOLUTION RESPIRATORY (INHALATION) at 18:22

## 2024-11-26 RX ADMIN — PANTOPRAZOLE SODIUM 40 MG: 40 INJECTION, POWDER, FOR SOLUTION INTRAVENOUS at 04:30

## 2024-11-26 RX ADMIN — OXYCODONE HYDROCHLORIDE AND ACETAMINOPHEN 1000 MG: 500 TABLET ORAL at 08:48

## 2024-11-26 NOTE — PROGRESS NOTES
Pt was admitted earlier by my collegue dr cortez , and was seen and examined  by myself  also , has been doing well ,still has numbness in the right LL , no discoloration or cyanosis, on liquid diet possible EGD and colonoscopy tomorrow  s/p transfusion 1 unit of PRBC , on admission hb was 5.4 , after transfusion 6.5 , hold ASA ad plavix , PAD s/p stents placed on right LL 2015 dr mcmillan, no foot discoloration

## 2024-11-26 NOTE — PLAN OF CARE
Problem: Discharge Planning  Goal: Discharge to home or other facility with appropriate resources  11/26/2024 1017 by Herminia Chung, RN  Outcome: Progressing     Problem: ABCDS Injury Assessment  Goal: Absence of physical injury  11/26/2024 1017 by Herminia Chung, RN  Outcome: Progressing     Problem: Safety - Adult  Goal: Free from fall injury  11/26/2024 1017 by Herminia Chung, RN  Outcome: Progressing

## 2024-11-26 NOTE — H&P
[facility]   History and Physical      CHIEF COMPLAINT:  right leg numbness    History of Present Illness:  74 y.o. female with a history of COPD on chronic 3L O2, PVD with multiple stents right leg, HTN, HLD, gastrectomy for PUD on B12 and iron presents with right leg numbness.  Reports she had bloodwork drawn at her PCP in September and he was concerned about her anemia but she doesn't know her baseline hgb.  Chart review shows her hgb was 7.6 in July.  She reports black stool since starting iron.  Workup in ED significant for Na 130, CO2 31, BUN 44, creatinine 2.1 (in August was 0.99), pro-BNP 1421, troponin 29 and 28, hgb 5.4.  CXR no acute finding.  CT head no acute finding.  CT abd/pelvis distended bladder without hydronephrosis.    Informant(s) for H&P: patient, chart    REVIEW OF SYSTEMS:  no fevers, chills, cp, sob, n/v, ha, vision/hearing changes, wt changes, hot/cold flashes, other open skin lesions, diarrhea, constipation, dysuria/hematuria unless noted in HPI. Complete ROS performed with the patient and is otherwise negative.      PMH:  Past Medical History:   Diagnosis Date    Anemia     Blood transfusion 2004    16 units plus BLEEDING ULCER    Cancer (HCC) 1971    uterus    Chronic obstructive pulmonary disease (HCC)     Hx of blood clots     LEG ON PLAVIX    Hyperlipidemia     Hypertension     PVD (peripheral vascular disease) (HCC)        Surgical History:  Past Surgical History:   Procedure Laterality Date    ABDOMEN SURGERY      repair bleeding ulcer    APPENDECTOMY      CARDIAC CATHETERIZATION  07/18/2018    Dr. Vasquez    COLON SURGERY  2005    6 inches colon removed    COLONOSCOPY      ENDOSCOPY, COLON, DIAGNOSTIC      HYSTERECTOMY (CERVIX STATUS UNKNOWN)      OTHER SURGICAL HISTORY  2004    blood clot removed from rt. leg x 2     SPINE SURGERY N/A 11/13/2020    KYPHOPLASTY T7, T8 performed by Philip Shaw DO [facility]    TONGUE BIOPSY      x 2     TRANSLUMINAL ANGIOPLASTY Right 4/15/15    stent Rt  distal SFA    VASCULAR SURGERY  2009    stent rt. leg    VASCULAR SURGERY  2014    stent rt leg       Medications Prior to Admission:    Prior to Admission medications    Medication Sig Start Date End Date Taking? Authorizing Provider   fluticasone-umeclidin-vilant (TRELEGY ELLIPTA) 100-62.5-25 MCG/INH AEPB Inhale 1 puff into the lungs Daily with lunch BRING    Reynaldo Hernandez MD   metoprolol succinate (TOPROL XL) 25 MG extended release tablet Take 1 tablet by mouth daily 7/19/18   Malcolm Saba MD   albuterol sulfate  (90 Base) MCG/ACT inhaler Inhale 2 puffs into the lungs every 6 hours as needed for Wheezing    Reynaldo Hernandez MD   calcium carbonate-vitamin D (CALTRATE) 600-400 MG-UNIT TABS per tab Take 1 tablet by mouth daily    Reynaldo Hernandez MD   loratadine (CLARITIN) 10 MG tablet Take 10 mg by mouth daily as needed     Reynaldo Hernandez MD   gabapentin (NEURONTIN) 300 MG capsule Take 300 mg by mouth 2 times daily. .    Reynaldo Hernandez MD   vitamin E 400 UNIT capsule Take 400 Units by mouth daily.    Reynaldo Hernandez MD   pyridoxine (B-6) 100 MG tablet Take 100 mg by mouth daily.    Reynaldo Hernandez MD   Ascorbic Acid (VITAMIN C) 250 MG tablet Take 1,000 mg by mouth daily.    Reynaldo Hernandez MD   clopidogrel (PLAVIX) 75 MG tablet Take 75 mg by mouth daily.      Reynaldo Hernandez MD   lisinopril (PRINIVIL;ZESTRIL) 40 MG tablet Take 40 mg by mouth daily.    Reynaldo Hernandez MD   lovastatin (MEVACOR) 40 MG tablet Take 40 mg by mouth nightly.      Reynaldo Hernandez MD   ferrous sulfate 325 (65 FE) MG tablet Take 325 mg by mouth daily (with breakfast)     Reynaldo Hernandez MD   BABY ASPIRIN PO Take 1 tablet by mouth daily. 81 mg    Reynaldo Hernandez MD   vitamin D (CHOLECALCIFEROL) 1000 UNIT TABS tablet Take 1,000 Units by mouth daily.      Reynaldo Hernandez MD   Cyanocobalamin

## 2024-11-26 NOTE — PROGRESS NOTES
Physical Therapy Initial Evaluation/Plan of Care    Room #:  0622/0622-02  Patient Name: Monica Epperson  YOB: 1950  MRN: [id number]    Date of Service: 11/26/2024     Tentative placement recommendation: Home with no Physical Therapy needs  Equipment recommendation: Cane  family to provide    Evaluating Physical Therapist: Mychal Cassidy, PT  #[id number]      Specific Provider Orders/Date/Referring Provider :     PT evaluation and treat  Start:  11/26/24 0015,   End:  11/26/24 0015,   ONE TIME,   Standing Count:  1 Occurrences,   R       Salome Carbone DO    Admitting Diagnosis:   Paresthesia [R20.2]  GI bleed [K92.2]  Acute renal failure, unspecified acute renal failure type (HCC) [N17.9]  Gastrointestinal hemorrhage, unspecified gastrointestinal hemorrhage type [K92.2]  Anemia, unspecified type [D64.9]     Admitted with    Right lower extremity numb/tingling, low h/h   Surgery: none  Visit Diagnoses         Codes    Anemia, unspecified type     D64.9    Acute renal failure, unspecified acute renal failure type (HCC)     N17.9    Paresthesia     R20.2            Patient Active Problem List   Diagnosis    Chest pain    Atypical chest pain    Essential hypertension    Mixed hyperlipidemia    Chronic obstructive pulmonary disease (HCC)    Subcutaneous emphysema resulting from a procedure    Ex-smoker for more than 1 year    Uterine carcinoma (HCC)    GI bleed    MAGDALENA (acute kidney injury) ([facility])    Chronic hypoxemic respiratory failure    PVD (peripheral vascular disease) ([facility])        ASSESSMENT of Current Deficits Patient exhibits decreased sensation Right lower extremity numbness distal from knee,  strength, balance, and endurance impairing functional mobility, transfers, gait , gait distance, and tolerance to activity are barriers to d/c and require skilled intervention to address concerns listed above to increase safety and independence at discharge.   Decreased strength, balance and endurance   session, patient in chair with daughter present call light and phone within reach,  all lines and tubes intact, nursing notified.      Patient would benefit from continued skilled Physical Therapy to improve functional independence and quality of life.         Patient's/ family goals   home    Time in  1158  Time out  1228    Total Treatment Time  10 minutes    Evaluation time includes thorough review of current medical information, gathering information on past medical history/social history and prior level of function, completion of standardized testing/informal observation of tasks, assessment of data, and development of Plan of care and goals.     CPT codes:  Low Complexity PT evaluation (93647)  Therapeutic activities (72508)   10 minutes  1 unit(s)    Mychal Cassidy, PT

## 2024-11-26 NOTE — ED PROVIDER NOTES
mg/dL    Est, Glom Filt Rate 24 (L) >60 mL/min/1.73m2    Calcium 11.7 (H) 8.6 - 10.2 mg/dL    Total Protein 6.1 (L) 6.4 - 8.3 g/dL    Albumin 4.1 3.5 - 5.2 g/dL    Total Bilirubin <0.2 0.0 - 1.2 mg/dL    Alkaline Phosphatase 71 35 - 104 U/L    ALT 17 0 - 32 U/L    AST 23 0 - 31 U/L   Protime-INR   Result Value Ref Range    Protime 10.6 9.3 - 12.4 sec    INR 1.0    APTT   Result Value Ref Range    APTT 27.0 24.5 - 35.1 sec   Brain Natriuretic Peptide   Result Value Ref Range    NT Pro-BNP 1,421 (H) 0 - 450 pg/mL   Magnesium   Result Value Ref Range    Magnesium 2.3 1.6 - 2.6 mg/dL   Troponin   Result Value Ref Range    Troponin, High Sensitivity 29 (H) 0 - 9 ng/L   Troponin   Result Value Ref Range    Troponin, High Sensitivity 28 (H) 0 - 9 ng/L   EKG 12 Lead   Result Value Ref Range    Ventricular Rate 77 BPM    Atrial Rate 77 BPM    P-R Interval 166 ms    QRS Duration 88 ms    Q-T Interval 370 ms    QTc Calculation (Bazett) 418 ms    P Axis 39 degrees    R Axis 22 degrees    T Axis 123 degrees   TYPE AND SCREEN   Result Value Ref Range    Blood Bank Sample Expiration 11/28/2024,2359     Arm Band Number [id number]     ABO/Rh O POSITIVE     Antibody Screen NEGATIVE     Unit Number [id number]     Component Leukocyte Reduced Red Cell     Unit Divison 00     Dispense Status Blood Bank ISSUED     Unit Issue Date/Time 202411252348     Product Code Blood Bank [id number]     Blood Bank Unit Type and Rh O POS     Blood Bank ISBT Product Blood Type 5100     Blood Bank Blood Product Expiration Date 202412242359     Transfusion Status OK TO TRANSFUSE     Crossmatch Result COMPATIBLE        Radiology  CT ABDOMEN PELVIS WO CONTRAST Additional Contrast? None   Final Result   No hydronephrosis.  Distended urinary bladder.      Additional observations as above.         CT HEAD WO CONTRAST   Final Result   No acute intracranial abnormality.         XR CHEST (2 VW)   Final Result   No acute process.                  ------------------------- NURSING NOTES AND VITALS REVIEWED ---------------------------  Date / Time Roomed:  11/25/2024  6:38 PM  ED Bed Assignment:  12/12    The nursing notes within the ED encounter and vital signs as below have been reviewed.   Patient Vitals for the past 24 hrs:   BP Temp Pulse Resp SpO2 Weight   11/25/24 2358 (!) 111/41 98.2 °F (36.8 °C) 77 18 99 % --   11/25/24 1837 (!) 144/77 -- -- 22 -- --   11/25/24 1836 -- -- -- -- -- 63.5 kg (140 lb)   11/25/24 1825 -- 98 °F (36.7 °C) (!) 103 -- 93 % --       Oxygen Saturation Interpretation: Normal      ------------------------------------------ PROGRESS NOTES ------------------------------------------  Re-evaluation(s):    I have spoken with the patient and discussed today’s results, in addition to providing specific details for the plan of care and counseling regarding the diagnosis and prognosis.  Their questions are answered at this time and they are agreeable with the plan.      --------------------------------- ADDITIONAL PROVIDER NOTES ---------------------------------  Consultations:  This patient's ED course included: a personal history and physicial examination, re-evaluation prior to disposition, multiple bedside re-evaluations, and IV medications    This patient has remained hemodynamically stable, remained unchanged, and been closely monitored during their ED course.    Please note that the withdrawal or failure to initiate urgent interventions for this patient would likely result in a life threatening deterioration or permanent disability.      Accordingly this patient received 30 minutes of critical care time, excluding separately billable procedures. Systems at risk for deterioration include: Cardiac.      Clinical Impression  1. Gastrointestinal hemorrhage, unspecified gastrointestinal hemorrhage type    2. Anemia, unspecified type    3. Acute renal failure, unspecified acute renal failure type (HCC)    4. Paresthesia

## 2024-11-26 NOTE — PROGRESS NOTES
4 Eyes Skin Assessment     NAME:  Monica Epperson  YOB: 1950  MEDICAL RECORD NUMBER:  [id number]    The patient is being assessed for  Admission    I agree that at least one RN has performed a thorough Head to Toe Skin Assessment on the patient. ALL assessment sites listed below have been assessed.      Areas assessed by both nurses:    Head, Face, Ears, Shoulders, Back, Chest, Arms, Elbows, Hands, Sacrum. Buttock, Coccyx, Ischium, Legs. Feet and Heels, Under Medical Devices , and Other   bruising to bilateral upper extremeties        Does the Patient have a Wound? No noted wound(s)       Sagar Prevention initiated by RN: No  Wound Care Orders initiated by RN: No    Pressure Injury (Stage 3,4, Unstageable, DTI, NWPT, and Complex wounds) if present, place Wound referral order by RN under : No    New Ostomies, if present place, Ostomy referral order under : No     Nurse 1 eSignature: Electronically signed by Bethany Velásquez RN on 11/26/24 at 4:14 AM EST    **SHARE this note so that the co-signing nurse can place an eSignature**    Nurse 2 eSignature: Electronically signed by Kyle Hawkins RN on 11/26/24 at 4:37 AM EST

## 2024-11-26 NOTE — ACP (ADVANCE CARE PLANNING)
Advance Care Planning   Healthcare Decision Maker:    Primary Decision Maker: Edna Johnsno - Child - [phone]

## 2024-11-26 NOTE — CONSENT
Informed Consent for Blood Component Transfusion Note    I have discussed with the patient the rationale for blood component transfusion; its benefits in treating or preventing fatigue, organ damage, or death; and its risk which includes mild transfusion reactions, rare risk of blood borne infection, or more serious but rare reactions. I have discussed the alternatives to transfusion, including the risk and consequences of not receiving transfusion. The patient had an opportunity to ask questions and had agreed to proceed with transfusion of blood components.    Electronically signed by Kaiser Jordan DO on 11/25/24 at 7:58 PM EST

## 2024-11-26 NOTE — PLAN OF CARE
Problem: Discharge Planning  Goal: Discharge to home or other facility with appropriate resources  11/26/2024 1018 by Herminia Chung, RN  Outcome: Progressing     Problem: ABCDS Injury Assessment  Goal: Absence of physical injury  11/26/2024 1018 by Herminia Chung, RN  Outcome: Progressing     Problem: Safety - Adult  Goal: Free from fall injury  11/26/2024 1018 by Herminia Chung, RN  Outcome: Progressing

## 2024-11-26 NOTE — CARE COORDINATION
Case Management Assessment  Initial Evaluation    Date/Time of Evaluation: 11/26/2024 11:37 AM  Assessment Completed by: DESTINY Chavez    If patient is discharged prior to next notation, then this note serves as note for discharge by case management.    Patient Name: Monica Epperson                   YOB: 1950  Diagnosis: Paresthesia [R20.2]  GI bleed [K92.2]  Acute renal failure, unspecified acute renal failure type (HCC) [N17.9]  Gastrointestinal hemorrhage, unspecified gastrointestinal hemorrhage type [K92.2]  Anemia, unspecified type [D64.9]                   Date / Time: 11/25/2024  6:38 PM    Patient Admission Status: Inpatient   Readmission Risk (Low < 19, Mod (19-27), High > 27): Readmission Risk Score: 17.6    Current PCP: Ranjan Carranza MD  PCP verified by CM? Yes    Chart Reviewed: Yes      History Provided by: Patient  Patient Orientation: Alert and Oriented    Patient Cognition: Alert    Hospitalization in the last 30 days (Readmission):  No    If yes, Readmission Assessment in CM Navigator will be completed.    Advance Directives:      Code Status: Full Code   Patient's Primary Decision Maker is: Legal Next of Kin    Primary Decision Maker: Edna Johnson - Child - [phone]    Discharge Planning:    Patient lives with: Alone Type of Home: House  Primary Care Giver: Self  Patient Support Systems include: Children   Current Financial resources: Medicare  Current community resources: None  Current services prior to admission: Oxygen Therapy            Current DME:              Type of Home Care services:  OT, PT    ADLS  Prior functional level: Independent in ADLs/IADLs  Current functional level: Independent in ADLs/IADLs    PT AM-PAC:   /24  OT AM-PAC:   /24    Family can provide assistance at DC: No  Would you like Case Management to discuss the discharge plan with any other family members/significant others, and if so, who? No  Plans to Return to Present

## 2024-11-26 NOTE — CONSULTS
CONSULT  Hamilton Morris M.D.  [facility]  Dr. Cindy Whitetn M.D.,  Dr. Bobby Serna M.D.,  Dr. Ashutosh Carmichael D.O.,  Dr. Spike Cruz D.O. ,  Dr. Andre Marinelli M.D.,      Monica PARKER Zhou  74 y.o.  female      Re:\"gi bleed\"  Requesting physician: Dr. Carbone  Date:10:19 AM 11/26/2024        HPI: 74-year-old female patient seen in the hospital for above described issue.  Patient has presented to the emergency department apparently yesterday with chief complaint of right leg numbness and tingling.  Patient was found to be significantly anemic on presentation.  She reports that she has been anemic for at least the past 6 months and her PCP Dr. Carranza apparently is doing blood work however patient denies being referred to GI or having any recent GI endoscopies.  Patient reports most recent upper endoscopy and colonoscopy to have been done in 2004 and 2005 by Dr. Pack.  Patient reports that she has been recently placed on iron supplementation and her stool has been dark/black.  Patient denies noticing bright red blood or dark red blood in the stool.  No bleeding in the urine.  Patient denies nausea vomiting.  She denies hematemesis emesis of coffee-ground material.  She denies dysphagia odynophagia.  She denies weight loss.  In addition to above laboratory work reveals hyponatremia, renal failure, hypercalcemia.  Liver profile is unremarkable with nonelevated transaminases, nonelevated alkaline phosphatase and nonelevated bilirubin.  Imaging has been obtained with CT scan of the abdomen and pelvis performed without contrast reported to show unremarkable liver.  Cholelithiasis but no biliary ductal dilatation.  Bowel is described to be without obstruction with findings of colonic diverticulosis.      Information sources:   -Patient  -medical record  -health care team    PMHx:  Past Medical History:   Diagnosis Date    Anemia     Blood transfusion 2004    16 units plus BLEEDING ULCER    Cancer (HCC)  nondistended  Extr.:  No peripheral edema  Muscles: Tone and bulk, consistent with aging condition  Skin: Warm and dry.  Anicteric    DATA:     Lab Results   Component Value Date/Time    WBC 9.8 11/25/2024 07:25 PM    RBC 1.64 11/25/2024 07:25 PM    HGB 6.5 11/26/2024 04:24 AM    HCT 19.8 11/26/2024 04:24 AM    .3 11/25/2024 07:25 PM    MCH 32.9 11/25/2024 07:25 PM    MCHC 31.6 11/25/2024 07:25 PM    RDW 19.1 11/25/2024 07:25 PM     11/25/2024 07:25 PM    MPV 10.2 11/25/2024 07:25 PM     Lab Results   Component Value Date/Time     11/25/2024 07:25 PM    K 4.3 11/25/2024 07:25 PM    CL 92 11/25/2024 07:25 PM    CO2 31 11/25/2024 07:25 PM    BUN 44 11/25/2024 07:25 PM    CREATININE 2.1 11/25/2024 07:25 PM    CALCIUM 11.7 11/25/2024 07:25 PM    BILITOT <0.2 11/25/2024 07:25 PM    ALKPHOS 71 11/25/2024 07:25 PM    AST 23 11/25/2024 07:25 PM    ALT 17 11/25/2024 07:25 PM     No results found for: \"LIPASE\"  No results found for: \"AMYLASE\"      ASSESSMENT/PLAN:  Patient Active Problem List   Diagnosis    Chest pain    Atypical chest pain    Essential hypertension    Mixed hyperlipidemia    Chronic obstructive pulmonary disease (HCC)    Subcutaneous emphysema resulting from a procedure    Ex-smoker for more than 1 year    Uterine carcinoma (HCC)    GI bleed    MAGDALENA (acute kidney injury) (HCC)    Chronic hypoxemic respiratory failure    PVD (peripheral vascular disease) (HCC)     1.  Anemia  -New anemia  -Macrocytic  -Obtaining of iron studies given transfusion will be of limited value  -Obtain FOBT  -No evidence of overt bleed but dark stool (possibly secondary to iron supplementation.  -Patient will require further evaluation with upper endoscopy and colonoscopy -see below    2.  Elevated troponin  -Per admitting/pertinent consultants    3.  Right lower extremity numbness and tingling  -Per admitting/pertinent consultants    Above has been discussed in detail with the patient and all questions answered

## 2024-11-27 ENCOUNTER — APPOINTMENT (OUTPATIENT)
Dept: ULTRASOUND IMAGING | Age: 74
DRG: 378 | End: 2024-11-27
Payer: MEDICARE

## 2024-11-27 ENCOUNTER — ANESTHESIA (OUTPATIENT)
Dept: ENDOSCOPY | Age: 74
End: 2024-11-27
Payer: MEDICARE

## 2024-11-27 ENCOUNTER — APPOINTMENT (OUTPATIENT)
Dept: INTERVENTIONAL RADIOLOGY/VASCULAR | Age: 74
DRG: 378 | End: 2024-11-27
Payer: MEDICARE

## 2024-11-27 ENCOUNTER — ANESTHESIA EVENT (OUTPATIENT)
Dept: ENDOSCOPY | Age: 74
End: 2024-11-27
Payer: MEDICARE

## 2024-11-27 LAB
ALBUMIN SERPL-MCNC: 3.4 G/DL (ref 3.5–5.2)
ALP SERPL-CCNC: 56 U/L (ref 35–104)
ALT SERPL-CCNC: 14 U/L (ref 0–32)
ANION GAP SERPL CALCULATED.3IONS-SCNC: 8 MMOL/L (ref 7–16)
AST SERPL-CCNC: 22 U/L (ref 0–31)
BILIRUB SERPL-MCNC: 0.3 MG/DL (ref 0–1.2)
BUN SERPL-MCNC: 32 MG/DL (ref 6–23)
CALCIUM SERPL-MCNC: 9.7 MG/DL (ref 8.6–10.2)
CHLORIDE SERPL-SCNC: 105 MMOL/L (ref 98–107)
CO2 SERPL-SCNC: 29 MMOL/L (ref 22–29)
CREAT SERPL-MCNC: 2 MG/DL (ref 0.5–1)
CREAT UR-MCNC: 73 MG/DL (ref 29–226)
EKG ATRIAL RATE: 77 BPM
EKG P AXIS: 39 DEGREES
EKG P-R INTERVAL: 166 MS
EKG Q-T INTERVAL: 370 MS
EKG QRS DURATION: 88 MS
EKG QTC CALCULATION (BAZETT): 418 MS
EKG R AXIS: 22 DEGREES
EKG T AXIS: 123 DEGREES
EKG VENTRICULAR RATE: 77 BPM
GFR, ESTIMATED: 26 ML/MIN/1.73M2
GLUCOSE SERPL-MCNC: 85 MG/DL (ref 74–99)
HCT VFR BLD AUTO: 23.2 % (ref 34–48)
HCT VFR BLD AUTO: 24.2 % (ref 34–48)
HGB BLD-MCNC: 7.4 G/DL (ref 11.5–15.5)
HGB BLD-MCNC: 7.5 G/DL (ref 11.5–15.5)
INR PPP: 1
IRON SATN MFR SERPL: 5 % (ref 15–50)
IRON SERPL-MCNC: 15 UG/DL (ref 37–145)
PARTIAL THROMBOPLASTIN TIME: 26.2 SEC (ref 24.5–35.1)
POTASSIUM SERPL-SCNC: 4 MMOL/L (ref 3.5–5)
PROT SERPL-MCNC: 5.2 G/DL (ref 6.4–8.3)
PROTHROMBIN TIME: 11 SEC (ref 9.3–12.4)
SODIUM SERPL-SCNC: 142 MMOL/L (ref 132–146)
SODIUM UR-SCNC: 77 MMOL/L
TIBC SERPL-MCNC: 305 UG/DL (ref 250–450)

## 2024-11-27 PROCEDURE — 7100000010 HC PHASE II RECOVERY - FIRST 15 MIN: Performed by: INTERNAL MEDICINE

## 2024-11-27 PROCEDURE — 85014 HEMATOCRIT: CPT

## 2024-11-27 PROCEDURE — 93010 ELECTROCARDIOGRAM REPORT: CPT | Performed by: INTERNAL MEDICINE

## 2024-11-27 PROCEDURE — 85018 HEMOGLOBIN: CPT

## 2024-11-27 PROCEDURE — 80053 COMPREHEN METABOLIC PANEL: CPT

## 2024-11-27 PROCEDURE — 2580000003 HC RX 258: Performed by: INTERNAL MEDICINE

## 2024-11-27 PROCEDURE — 2580000003 HC RX 258: Performed by: HOSPITALIST

## 2024-11-27 PROCEDURE — 2060000000 HC ICU INTERMEDIATE R&B

## 2024-11-27 PROCEDURE — 6360000002 HC RX W HCPCS: Performed by: INTERNAL MEDICINE

## 2024-11-27 PROCEDURE — 85610 PROTHROMBIN TIME: CPT

## 2024-11-27 PROCEDURE — 82570 ASSAY OF URINE CREATININE: CPT

## 2024-11-27 PROCEDURE — 84300 ASSAY OF URINE SODIUM: CPT

## 2024-11-27 PROCEDURE — 94640 AIRWAY INHALATION TREATMENT: CPT

## 2024-11-27 PROCEDURE — 97530 THERAPEUTIC ACTIVITIES: CPT

## 2024-11-27 PROCEDURE — 99233 SBSQ HOSP IP/OBS HIGH 50: CPT | Performed by: INTERNAL MEDICINE

## 2024-11-27 PROCEDURE — 6360000002 HC RX W HCPCS: Performed by: FAMILY MEDICINE

## 2024-11-27 PROCEDURE — 88305 TISSUE EXAM BY PATHOLOGIST: CPT

## 2024-11-27 PROCEDURE — 2580000003 HC RX 258: Performed by: FAMILY MEDICINE

## 2024-11-27 PROCEDURE — 2700000000 HC OXYGEN THERAPY PER DAY

## 2024-11-27 PROCEDURE — 0DB88ZX EXCISION OF SMALL INTESTINE, VIA NATURAL OR ARTIFICIAL OPENING ENDOSCOPIC, DIAGNOSTIC: ICD-10-PCS | Performed by: INTERNAL MEDICINE

## 2024-11-27 PROCEDURE — 7100000011 HC PHASE II RECOVERY - ADDTL 15 MIN: Performed by: INTERNAL MEDICINE

## 2024-11-27 PROCEDURE — 85730 THROMBOPLASTIN TIME PARTIAL: CPT

## 2024-11-27 PROCEDURE — 3700000001 HC ADD 15 MINUTES (ANESTHESIA): Performed by: INTERNAL MEDICINE

## 2024-11-27 PROCEDURE — 93925 LOWER EXTREMITY STUDY: CPT

## 2024-11-27 PROCEDURE — 6370000000 HC RX 637 (ALT 250 FOR IP): Performed by: FAMILY MEDICINE

## 2024-11-27 PROCEDURE — 76770 US EXAM ABDO BACK WALL COMP: CPT

## 2024-11-27 PROCEDURE — 6360000002 HC RX W HCPCS: Performed by: NURSE ANESTHETIST, CERTIFIED REGISTERED

## 2024-11-27 PROCEDURE — 2709999900 HC NON-CHARGEABLE SUPPLY: Performed by: INTERNAL MEDICINE

## 2024-11-27 PROCEDURE — 83540 ASSAY OF IRON: CPT

## 2024-11-27 PROCEDURE — 3700000000 HC ANESTHESIA ATTENDED CARE: Performed by: INTERNAL MEDICINE

## 2024-11-27 PROCEDURE — 36415 COLL VENOUS BLD VENIPUNCTURE: CPT

## 2024-11-27 PROCEDURE — 6360000002 HC RX W HCPCS: Performed by: HOSPITALIST

## 2024-11-27 PROCEDURE — 3609012400 HC EGD TRANSORAL BIOPSY SINGLE/MULTIPLE: Performed by: INTERNAL MEDICINE

## 2024-11-27 PROCEDURE — 83550 IRON BINDING TEST: CPT

## 2024-11-27 RX ORDER — PROPOFOL 10 MG/ML
INJECTION, EMULSION INTRAVENOUS
Status: DISCONTINUED | OUTPATIENT
Start: 2024-11-27 | End: 2024-11-27 | Stop reason: SDUPTHER

## 2024-11-27 RX ADMIN — ATORVASTATIN CALCIUM 20 MG: 20 TABLET, FILM COATED ORAL at 20:52

## 2024-11-27 RX ADMIN — ARFORMOTEROL TARTRATE 15 MCG: 15 SOLUTION RESPIRATORY (INHALATION) at 17:33

## 2024-11-27 RX ADMIN — SODIUM CHLORIDE 125 MG: 9 INJECTION, SOLUTION INTRAVENOUS at 13:25

## 2024-11-27 RX ADMIN — CYANOCOBALAMIN TAB 1000 MCG 2000 MCG: 1000 TAB at 20:52

## 2024-11-27 RX ADMIN — PANTOPRAZOLE SODIUM 40 MG: 40 INJECTION, POWDER, FOR SOLUTION INTRAVENOUS at 13:21

## 2024-11-27 RX ADMIN — SODIUM CHLORIDE: 9 INJECTION, SOLUTION INTRAVENOUS at 04:53

## 2024-11-27 RX ADMIN — Medication 1000 UNITS: at 09:33

## 2024-11-27 RX ADMIN — ARFORMOTEROL TARTRATE 15 MCG: 15 SOLUTION RESPIRATORY (INHALATION) at 04:56

## 2024-11-27 RX ADMIN — LISINOPRIL 40 MG: 20 TABLET ORAL at 09:33

## 2024-11-27 RX ADMIN — GABAPENTIN 300 MG: 300 CAPSULE ORAL at 09:33

## 2024-11-27 RX ADMIN — OXYCODONE HYDROCHLORIDE AND ACETAMINOPHEN 1000 MG: 500 TABLET ORAL at 09:33

## 2024-11-27 RX ADMIN — PROPOFOL 150 MG: 10 INJECTION, EMULSION INTRAVENOUS at 15:26

## 2024-11-27 RX ADMIN — SODIUM CHLORIDE: 9 INJECTION, SOLUTION INTRAVENOUS at 16:42

## 2024-11-27 RX ADMIN — SODIUM CHLORIDE, PRESERVATIVE FREE 10 ML: 5 INJECTION INTRAVENOUS at 20:54

## 2024-11-27 RX ADMIN — Medication 400 UNITS: at 09:33

## 2024-11-27 RX ADMIN — BUDESONIDE INHALATION 500 MCG: 0.5 SUSPENSION RESPIRATORY (INHALATION) at 04:56

## 2024-11-27 RX ADMIN — FERROUS SULFATE TAB 325 MG (65 MG ELEMENTAL FE) 325 MG: 325 (65 FE) TAB at 09:33

## 2024-11-27 RX ADMIN — Medication 1 TABLET: at 09:33

## 2024-11-27 RX ADMIN — GABAPENTIN 300 MG: 300 CAPSULE ORAL at 20:52

## 2024-11-27 RX ADMIN — SODIUM CHLORIDE, PRESERVATIVE FREE 10 ML: 5 INJECTION INTRAVENOUS at 09:33

## 2024-11-27 RX ADMIN — BUDESONIDE INHALATION 500 MCG: 0.5 SUSPENSION RESPIRATORY (INHALATION) at 17:33

## 2024-11-27 RX ADMIN — CYANOCOBALAMIN TAB 1000 MCG 2000 MCG: 1000 TAB at 09:33

## 2024-11-27 RX ADMIN — IPRATROPIUM BROMIDE AND ALBUTEROL SULFATE 1 DOSE: .5; 2.5 SOLUTION RESPIRATORY (INHALATION) at 04:56

## 2024-11-27 RX ADMIN — PANTOPRAZOLE SODIUM 40 MG: 40 INJECTION, POWDER, FOR SOLUTION INTRAVENOUS at 00:55

## 2024-11-27 RX ADMIN — PYRIDOXINE HCL TAB 50 MG 100 MG: 50 TAB at 09:33

## 2024-11-27 RX ADMIN — METOPROLOL SUCCINATE 25 MG: 25 TABLET, EXTENDED RELEASE ORAL at 09:33

## 2024-11-27 NOTE — PROCEDURES
Procedure:  Esophagogastroduodenoscopy with Biopsy    Indication: Iron deficiency anemia, questionable melena    Sedation  MAC    Endoscope was advanced easily through mouth to second portion of duodenum      Oropharynx views are limited but grossly normal.    Esophagus:   Mucosa is normal.  GEJ at 32 cm.      Stomach:   Antrum is normal    Gastric body is normal.    Retroflexed views show normal fundus and cardia.    Duodenum: Bulb is normal.    Second portion of duodenum is normal.                          Biopsies for celiac taken                          Possible small AVM second portion duodenum    EBL: Less than 1 cc    IMPRESSION AND PLAN:     1.  possible small AVM second portion duodenum nonbleeding   2.  Normal upper endoscopic exam otherwise, biopsies for celiac taken    Recommendations: Outpatient colonoscopy.  Can restart antiplatelet therapy with colonoscopy to be done as an outpatient in next 2 to 3 weeks.  If patient is going to be in house then hold Plavix and colonoscopy on Friday.      Follow up as outpatient in office, call [phone] to schedule for appointment.      TRAVIS Marinelli MD  11/27/2024  3:32 PM      [id number] NOTE  Date: 11/27/2024   Name: Monica Epperson  YOB: 1950    Procedures

## 2024-11-27 NOTE — ANESTHESIA POSTPROCEDURE EVALUATION
Department of Anesthesiology  Postprocedure Note    Patient: Monica Epperson  MRN: [id number]  YOB: 1950  Date of evaluation: 11/27/2024    Procedure Summary       Date: 11/27/24 Room / Location: [address]    Anesthesia Start: 1523 Anesthesia Stop: 1534    Procedure: ESOPHAGOGASTRODUODENOSCOPY BIOPSY Diagnosis:       Anemia, unspecified type      (Anemia, unspecified type [D64.9])    Surgeons: TRAVIS Marinelli MD Responsible Provider: Rajan Govea MD    Anesthesia Type: MAC ASA Status: 3            Anesthesia Type: MAC    Anne Phase I:      Anne Phase II: Anne Score: 9    Anesthesia Post Evaluation    Patient location during evaluation: bedside  Patient participation: complete - patient participated  Level of consciousness: awake  Pain score: 0  Airway patency: patent  Nausea & Vomiting: no vomiting and no nausea  Cardiovascular status: hemodynamically stable  Respiratory status: acceptable  Hydration status: stable  Pain management: adequate        No notable events documented.

## 2024-11-27 NOTE — ANESTHESIA PRE PROCEDURE
Department of Anesthesiology  Preprocedure Note       Name:  Monica Epperson   Age:  74 y.o.  :  1950                                          MRN:  [id number]         Date:  2024      Surgeon: Surgeon(s):  Hector Pack MD    Procedure: Procedure(s):  ESOPHAGOGASTRODUODENOSCOPY    Medications prior to admission:   Prior to Admission medications    Medication Sig Start Date End Date Taking? Authorizing Provider   fluticasone-umeclidin-vilant (TRELEGY ELLIPTA) 100-62.5-25 MCG/INH AEPB Inhale 1 puff into the lungs Daily with lunch BRING    Reynaldo Hernandez MD   metoprolol succinate (TOPROL XL) 25 MG extended release tablet Take 1 tablet by mouth daily 18   Malcolm Saba MD   albuterol sulfate  (90 Base) MCG/ACT inhaler Inhale 2 puffs into the lungs every 6 hours as needed for Wheezing    Reynaldo Hernandez MD   calcium carbonate-vitamin D (CALTRATE) 600-400 MG-UNIT TABS per tab Take 1 tablet by mouth daily    Reynaldo Hernandez MD   loratadine (CLARITIN) 10 MG tablet Take 10 mg by mouth daily as needed     Reynaldo Hernandez MD   gabapentin (NEURONTIN) 300 MG capsule Take 300 mg by mouth 2 times daily. .    Reynaldo Hernandez MD   vitamin E 400 UNIT capsule Take 400 Units by mouth daily.    Reynaldo Hernandez MD   pyridoxine (B-6) 100 MG tablet Take 100 mg by mouth daily.    Reynaldo Hernandez MD   Ascorbic Acid (VITAMIN C) 250 MG tablet Take 1,000 mg by mouth daily.    Reynaldo Hernandez MD   clopidogrel (PLAVIX) 75 MG tablet Take 75 mg by mouth daily.      Reynaldo Hernandez MD   lisinopril (PRINIVIL;ZESTRIL) 40 MG tablet Take 40 mg by mouth daily.    Reynaldo Hernandez MD   lovastatin (MEVACOR) 40 MG tablet Take 40 mg by mouth nightly.      Reynaldo Hernandez MD   ferrous sulfate 325 (65 FE) MG tablet Take 325 mg by mouth daily (with breakfast)     Reynaldo Hernandez MD   BABY ASPIRIN PO Take 1 tablet by mouth daily. 81 mg    David

## 2024-11-27 NOTE — H&P
Immediately prior to the procedure the patient's History and Physical was reviewed- there are no changes with the current vitals.Blood pressure 123/61, pulse 59, temperature 98 °F (36.7 °C), temperature source Oral, resp. rate 19, height 1.6 m (5' 3\"), weight 63.5 kg (140 lb), SpO2 98%, not currently breastfeeding.

## 2024-11-27 NOTE — PROGRESS NOTES
PROGRESS NOTE  By Hamilton Morris M.D.    [facility]  Dr. Cindy Whitten M.D.,  Dr. Bobby Serna M.D.,   Dr. Ashutosh Carmichael D.O.,  Dr. Andre Marinelli M.D.,  ALVA FinchO.,          Monica PARKER Epperson  74 y.o.  female    SUBJECTIVE:  Denies abdominal pain.  She reports leg pain    OBJECTIVE:    BP (!) 122/54   Pulse 77   Temp 97.9 °F (36.6 °C)   Resp 15   Ht 1.6 m (5' 3\")   Wt 63.5 kg (140 lb)   SpO2 96%   BMI 24.80 kg/m²     General: NAD/older adult  female.  AAOx3  HEENT: Anicteric sclera/moist oral mucosa  Neck: Supple/trachea midline  Chest: CTAB/symmetric excursions  Cor: Regular/S1-S2  Abd.: Soft/nontender nondistended  Extr.:  Decreased muscle tone and bulk throughout.  No significant peripheral edema  Skin: Warm and dry/anicteric        DATA:    Monitor data reviewed -sinus rhythm noted.       Lab Results   Component Value Date/Time    WBC 9.8 11/25/2024 07:25 PM    RBC 1.64 11/25/2024 07:25 PM    HGB 7.4 11/27/2024 05:21 AM    HCT 23.2 11/27/2024 05:21 AM    .3 11/25/2024 07:25 PM    MCH 32.9 11/25/2024 07:25 PM    MCHC 31.6 11/25/2024 07:25 PM    RDW 19.1 11/25/2024 07:25 PM     11/25/2024 07:25 PM    MPV 10.2 11/25/2024 07:25 PM     Lab Results   Component Value Date/Time     11/27/2024 08:00 AM    K 4.0 11/27/2024 08:00 AM     11/27/2024 08:00 AM    CO2 29 11/27/2024 08:00 AM    BUN 32 11/27/2024 08:00 AM    CREATININE 2.0 11/27/2024 08:00 AM    CALCIUM 9.7 11/27/2024 08:00 AM    BILITOT 0.3 11/27/2024 08:00 AM    ALKPHOS 56 11/27/2024 08:00 AM    AST 22 11/27/2024 08:00 AM    ALT 14 11/27/2024 08:00 AM     No results found for: \"LIPASE\"  No results found for: \"AMYLASE\"      ASSESSMENT/PLAN:  Patient Active Problem List   Diagnosis    Chest pain    Atypical chest pain    Essential hypertension    Mixed hyperlipidemia    Chronic obstructive pulmonary disease (HCC)    Subcutaneous emphysema resulting from a procedure    Ex-smoker for more

## 2024-11-27 NOTE — PROGRESS NOTES
Physical Therapy Treatment Note/Plan of Care    Room #:  0622/0622-02  Patient Name: Monica Epperson  YOB: 1950  MRN: [id number]    Date of Service: 11/27/2024     Tentative placement recommendation: Home with no Physical Therapy needs  Equipment recommendation: Cane  family to provide    Evaluating Physical Therapist: Mychal Cassidy, PT  #[id number]      Specific Provider Orders/Date/Referring Provider :     PT evaluation and treat  Start:  11/26/24 0015,   End:  11/26/24 0015,   ONE TIME,   Standing Count:  1 Occurrences,   R       Salome Carbone DO    Admitting Diagnosis:   Paresthesia [R20.2]  GI bleed [K92.2]  Acute renal failure, unspecified acute renal failure type (HCC) [N17.9]  Gastrointestinal hemorrhage, unspecified gastrointestinal hemorrhage type [K92.2]  Anemia, unspecified type [D64.9]     Admitted with    Right lower extremity numb/tingling, low h/h   Surgery: none  Visit Diagnoses         Codes    Anemia, unspecified type     D64.9    Acute renal failure, unspecified acute renal failure type (HCC)     N17.9    Paresthesia     R20.2    Numbness and tingling of right leg     R20.0, R20.2            Patient Active Problem List   Diagnosis    Chest pain    Atypical chest pain    Essential hypertension    Mixed hyperlipidemia    Chronic obstructive pulmonary disease (HCC)    Subcutaneous emphysema resulting from a procedure    Ex-smoker for more than 1 year    Uterine carcinoma (HCC)    GI bleed    MAGDALENA (acute kidney injury) ([facility])    Chronic hypoxemic respiratory failure    PVD (peripheral vascular disease) ([facility])        ASSESSMENT of Current Deficits  Patient is mildly unsteady with gait using a single point cane during ambulation with no loss of balance.  Patient with difficulty oxygenating impairing endurance and functional independence requiring 3 Liters of o2 via nasal cannula to maintain > 90% po2. Patient needing a seated rest period due to shortness of breath and impaired  UNKNOWN)      OTHER SURGICAL HISTORY  2004    blood clot removed from rt. leg x 2     SPINE SURGERY N/A 11/13/2020    [place]7, [place] [facility] [place]UE BIOPSY      x 2    TRANSLUMINAL ANGIOPLASTY Right 4/15/15    stent Rt  distal SFA    VASCULAR SURGERY  2009    stent rt. leg    VASCULAR SURGERY  2014    stent rt leg       SUBJECTIVE:    Precautions: Up with assistance and titrate/wean oxygen, falls, alarm, and O2 ,  3 Liters of o2 via nasal cannula     Social history: Patient lives alone in a mobile home  with 3 steps, with rail   to enter home.  Tub shower       Equipment owned: None,       AM-PAC Basic Mobility        AM-PAC Basic Mobility - Inpatient   How much help is needed turning from your back to your side while in a flat bed without using bedrails?: None  How much help is needed moving from lying on your back to sitting on the side of a flat bed without using bedrails?: None  How much help is needed moving to and from a bed to a chair?: A Little  How much help is needed standing up from a chair using your arms?: A Little  How much help is needed walking in hospital room?: A Little  How much help is needed climbing 3-5 steps with a railing?: A Little  AM-[facility] Inpatient Mobility Raw Score : 20  AM-PAC Inpatient T-Scale Score : 47.67  Mobility Inpatient CMS 0-100% Score: 35.83  Mobility Inpatient CMS G-Code Modifier : CJ    Nursing cleared patient for PT treatment.      OBJECTIVE:   Initial Evaluation  Date: 11/26/2024 Treatment Date:  11/27/2024       Short Term/ Long Term   Goals   Was pt agreeable to Eval/treatment? Yes yes To be met in 3 days   Pain level   0/10    0/10    Bed Mobility  Using rails and head of bed elevated:     Rolling: Independent    Supine to sit: Independent    Sit to supine: Independent    Scooting: Independent    Using rails and head of bed elevated:     Rolling: Independent   Supine to sit: Independent   Sit to supine: Independent   Scooting:

## 2024-11-27 NOTE — PROGRESS NOTES
Progress Note  Date:2024       Room:22/0622-02  Patient Name:Monica Epperson     YOB: 1950     Age:74 y.o.        Subjective    Subjective:  Symptoms:  Stable.  No shortness of breath, cough, chest pain, weakness or diarrhea.  (On O2 , she is on chronic o2 3-4 L , feels ok ,  complaints of numbness in the right LL ).    Diet:  NPO.  No nausea.    Activity level: Normal.       Review of Systems   Constitutional:  Negative for appetite change, chills, fatigue, fever and unexpected weight change.   HENT:  Negative for congestion, facial swelling, mouth sores, rhinorrhea and sinus pain.    Eyes:  Negative for visual disturbance.   Respiratory:  Negative for cough, chest tightness, shortness of breath and wheezing.    Cardiovascular:  Negative for chest pain and leg swelling.   Gastrointestinal:  Negative for abdominal distention, abdominal pain, blood in stool, constipation, diarrhea and nausea.   Genitourinary:  Negative for difficulty urinating, dysuria, frequency and urgency.   Musculoskeletal:  Negative for joint swelling.   Skin:  Negative for rash.   Neurological:  Positive for numbness. Negative for dizziness, syncope, weakness, light-headedness and headaches.        Right lower limb    Psychiatric/Behavioral:  Negative for behavioral problems, confusion and hallucinations.      Objective         Vitals Last 24 Hours:  TEMPERATURE:  Temp  Av.1 °F (36.7 °C)  Min: 97.9 °F (36.6 °C)  Max: 98.4 °F (36.9 °C)  RESPIRATIONS RANGE: Resp  Av  Min: 15  Max: 19  PULSE OXIMETRY RANGE: SpO2  Av %  Min: 96 %  Max: 100 %  PULSE RANGE: Pulse  Av.6  Min: 59  Max: 77  BLOOD PRESSURE RANGE: Systolic (24hrs), Av , Min:122 , Max:124   ; Diastolic (24hrs), Av, Min:54, Max:70    I/O (24Hr):    Intake/Output Summary (Last 24 hours) at 2024 1212  Last data filed at 2024 0861  Gross per 24 hour   Intake 0 ml   Output --   Net 0 ml     Objective:  General Appearance:   exposure control, iterative reconstruction, and/or weight based adjustment of the mA/kV was utilized to reduce the radiation dose to as low as reasonably achievable. COMPARISON: None. HISTORY: ORDERING SYSTEM PROVIDED HISTORY: acute renal failure TECHNOLOGIST PROVIDED HISTORY: Additional Contrast?->None Reason for exam:->acute renal failure Decision Support Exception - unselect if not a suspected or confirmed emergency medical condition->Emergency Medical Condition (MA) FINDINGS: LOWER CHEST: Visualized lung bases are clear. LIVER: Unremarkable. BILIARY: Cholelithiasis.  No biliary dilatation. SPLEEN: Unremarkable. PANCREAS: Grossly unremarkable. ADRENALS: Unremarkable. KIDNEYS: No hydronephrosis.  Calcifications bilaterally favored to be vascular but cannot exclude a nonobstructive caliceal calculi.  Mild perinephric stranding, a nonspecific finding. GI: No bowel obstruction.  Appendix not identified.  Mild colonic diverticulosis.  No pneumoperitoneum. LYMPH NODES: No significant lymphadenopathy. VESSELS: Abdominal aorta is nonaneurysmal.  Moderate vascular calcification. PELVIS: Bladder is distended.  Uterus is absent. BONES: Compression L5 of indeterminate chronicity. ADDITIONAL FINDINGS: None.     No hydronephrosis.  Distended urinary bladder. Additional observations as above.     CT HEAD WO CONTRAST    Result Date: 11/25/2024  EXAMINATION: CT OF THE HEAD WITHOUT CONTRAST  11/25/2024 8:58 pm TECHNIQUE: CT of the head was performed without the administration of intravenous contrast. Automated exposure control, iterative reconstruction, and/or weight based adjustment of the mA/kV was utilized to reduce the radiation dose to as low as reasonably achievable. COMPARISON: None. HISTORY: ORDERING SYSTEM PROVIDED HISTORY: leg paresthesia TECHNOLOGIST PROVIDED HISTORY: Has a \"code stroke\" or \"stroke alert\" been called?->No Reason for exam:->leg paresthesia Decision Support Exception - unselect if not a suspected or

## 2024-11-27 NOTE — PROGRESS NOTES
Date:2024  Patient Name: Monica Epperson  MRN: [id number]  : 1950  ROOM #: 0622/0622-02    Occupational Therapy order received, chart reviewed and evaluation attempted this date. Patient is unavailable for OT evaluation due to off floor for scope-per nursing.    Will attempt OT evaluation at a later time. Thank you.   Evaluating OT: Josie Saba OTR/L [id number]

## 2024-11-27 NOTE — PLAN OF CARE
Problem: Discharge Planning  Goal: Discharge to home or other facility with appropriate resources  11/27/2024 1011 by Ranjan Joel RN  Outcome: Progressing  Flowsheets (Taken 11/27/2024 1011)  Discharge to home or other facility with appropriate resources:   Identify barriers to discharge with patient and caregiver   Arrange for needed discharge resources and transportation as appropriate   Identify discharge learning needs (meds, wound care, etc)     Problem: ABCDS Injury Assessment  Goal: Absence of physical injury  11/27/2024 1011 by Ranjan Joel, RN  Outcome: Progressing  Flowsheets (Taken 11/27/2024 1011)  Absence of Physical Injury: Implement safety measures based on patient assessment     Problem: Safety - Adult  Goal: Free from fall injury  11/27/2024 1011 by Ranjan Joel RN  Outcome: Progressing  Flowsheets (Taken 11/27/2024 1011)  Free From Fall Injury: Instruct family/caregiver on patient safety

## 2024-11-27 NOTE — PROGRESS NOTES
Received call about patient with occluded RLE stents  Patient with multiple endovascular intervention with CT surgeon in past  Occluded R SFA stent/s on duplex  No wounds on feet and no rest pain per report  Main complaint is numbness of entire leg including thigh/calf/foot  Advised that numbness is unlikely to be related to vascular etiology. Will send follow up with me in my office for her PAD.   Thanks for consult    Reji Tay MD

## 2024-11-27 NOTE — CARE COORDINATION
SOCIAL WORK / DISCHARGE PLANNING:  Sw met with pt at bedside. She is waiting to have EGD today. Dc plan to return home alone, plans to borrow SPC from her son in law. Pt has home O2 (3L) from [facility]. She denies any dc needs at this time. Family will provide home going transport.         Electronically signed by DESTINY Patrick on 11/27/2024 at 12:42 PM

## 2024-11-28 ENCOUNTER — ANESTHESIA EVENT (OUTPATIENT)
Dept: ENDOSCOPY | Age: 74
End: 2024-11-28
Payer: MEDICARE

## 2024-11-28 LAB
ALBUMIN SERPL-MCNC: 3.4 G/DL (ref 3.5–5.2)
ALP SERPL-CCNC: 59 U/L (ref 35–104)
ALT SERPL-CCNC: 13 U/L (ref 0–32)
ANION GAP SERPL CALCULATED.3IONS-SCNC: 5 MMOL/L (ref 7–16)
AST SERPL-CCNC: 19 U/L (ref 0–31)
BASOPHILS # BLD: 0.02 K/UL (ref 0–0.2)
BASOPHILS NFR BLD: 0 % (ref 0–2)
BILIRUB SERPL-MCNC: 0.8 MG/DL (ref 0–1.2)
BUN SERPL-MCNC: 21 MG/DL (ref 6–23)
CALCIUM SERPL-MCNC: 8.6 MG/DL (ref 8.6–10.2)
CHLORIDE SERPL-SCNC: 109 MMOL/L (ref 98–107)
CO2 SERPL-SCNC: 27 MMOL/L (ref 22–29)
CREAT SERPL-MCNC: 1.6 MG/DL (ref 0.5–1)
EOSINOPHIL # BLD: 0.22 K/UL (ref 0.05–0.5)
EOSINOPHILS RELATIVE PERCENT: 4 % (ref 0–6)
ERYTHROCYTE [DISTWIDTH] IN BLOOD BY AUTOMATED COUNT: 18.8 % (ref 11.5–15)
GFR, ESTIMATED: 32 ML/MIN/1.73M2
GLUCOSE SERPL-MCNC: 87 MG/DL (ref 74–99)
HCT VFR BLD AUTO: 22.6 % (ref 34–48)
HCT VFR BLD AUTO: 25.2 % (ref 34–48)
HCT VFR BLD AUTO: 29.7 % (ref 34–48)
HGB BLD-MCNC: 6.9 G/DL (ref 11.5–15.5)
HGB BLD-MCNC: 7.9 G/DL (ref 11.5–15.5)
HGB BLD-MCNC: 9.5 G/DL (ref 11.5–15.5)
IMM GRANULOCYTES # BLD AUTO: <0.03 K/UL (ref 0–0.58)
IMM GRANULOCYTES NFR BLD: 0 % (ref 0–5)
LYMPHOCYTES NFR BLD: 1.73 K/UL (ref 1.5–4)
LYMPHOCYTES RELATIVE PERCENT: 27 % (ref 20–42)
MCH RBC QN AUTO: 30.4 PG (ref 26–35)
MCHC RBC AUTO-ENTMCNC: 31.3 G/DL (ref 32–34.5)
MCV RBC AUTO: 96.9 FL (ref 80–99.9)
MONOCYTES NFR BLD: 0.59 K/UL (ref 0.1–0.95)
MONOCYTES NFR BLD: 9 % (ref 2–12)
NEUTROPHILS NFR BLD: 59 % (ref 43–80)
NEUTS SEG NFR BLD: 3.79 K/UL (ref 1.8–7.3)
PLATELET # BLD AUTO: 314 K/UL (ref 130–450)
PMV BLD AUTO: 9.7 FL (ref 7–12)
POTASSIUM SERPL-SCNC: 4 MMOL/L (ref 3.5–5)
PROT SERPL-MCNC: 4.9 G/DL (ref 6.4–8.3)
RBC # BLD AUTO: 2.6 M/UL (ref 3.5–5.5)
SODIUM SERPL-SCNC: 141 MMOL/L (ref 132–146)
WBC OTHER # BLD: 6.4 K/UL (ref 4.5–11.5)

## 2024-11-28 PROCEDURE — 6360000002 HC RX W HCPCS: Performed by: FAMILY MEDICINE

## 2024-11-28 PROCEDURE — 94640 AIRWAY INHALATION TREATMENT: CPT

## 2024-11-28 PROCEDURE — 6370000000 HC RX 637 (ALT 250 FOR IP): Performed by: FAMILY MEDICINE

## 2024-11-28 PROCEDURE — P9016 RBC LEUKOCYTES REDUCED: HCPCS

## 2024-11-28 PROCEDURE — 80053 COMPREHEN METABOLIC PANEL: CPT

## 2024-11-28 PROCEDURE — 85018 HEMOGLOBIN: CPT

## 2024-11-28 PROCEDURE — 85025 COMPLETE CBC W/AUTO DIFF WBC: CPT

## 2024-11-28 PROCEDURE — 6370000000 HC RX 637 (ALT 250 FOR IP): Performed by: HOSPITALIST

## 2024-11-28 PROCEDURE — 2060000000 HC ICU INTERMEDIATE R&B

## 2024-11-28 PROCEDURE — 6360000002 HC RX W HCPCS: Performed by: HOSPITALIST

## 2024-11-28 PROCEDURE — 2580000003 HC RX 258: Performed by: FAMILY MEDICINE

## 2024-11-28 PROCEDURE — 97530 THERAPEUTIC ACTIVITIES: CPT

## 2024-11-28 PROCEDURE — 97165 OT EVAL LOW COMPLEX 30 MIN: CPT

## 2024-11-28 PROCEDURE — 99232 SBSQ HOSP IP/OBS MODERATE 35: CPT | Performed by: INTERNAL MEDICINE

## 2024-11-28 PROCEDURE — 85014 HEMATOCRIT: CPT

## 2024-11-28 PROCEDURE — 2700000000 HC OXYGEN THERAPY PER DAY

## 2024-11-28 PROCEDURE — 36430 TRANSFUSION BLD/BLD COMPNT: CPT

## 2024-11-28 PROCEDURE — 36415 COLL VENOUS BLD VENIPUNCTURE: CPT

## 2024-11-28 PROCEDURE — 2580000003 HC RX 258: Performed by: HOSPITALIST

## 2024-11-28 RX ORDER — BISACODYL 5 MG/1
10 TABLET, DELAYED RELEASE ORAL ONCE
Status: COMPLETED | OUTPATIENT
Start: 2024-11-28 | End: 2024-11-28

## 2024-11-28 RX ORDER — SODIUM CHLORIDE 9 MG/ML
INJECTION, SOLUTION INTRAVENOUS PRN
Status: DISCONTINUED | OUTPATIENT
Start: 2024-11-28 | End: 2024-12-01 | Stop reason: HOSPADM

## 2024-11-28 RX ORDER — POLYETHYLENE GLYCOL 3350 17 G/17G
238 POWDER, FOR SOLUTION ORAL ONCE
Status: COMPLETED | OUTPATIENT
Start: 2024-11-28 | End: 2024-11-28

## 2024-11-28 RX ORDER — BISACODYL 5 MG/1
10 TABLET, DELAYED RELEASE ORAL ONCE
Status: DISCONTINUED | OUTPATIENT
Start: 2024-11-28 | End: 2024-11-28

## 2024-11-28 RX ORDER — POLYETHYLENE GLYCOL 3350 17 G/17G
238 POWDER, FOR SOLUTION ORAL ONCE
Status: DISCONTINUED | OUTPATIENT
Start: 2024-11-28 | End: 2024-11-28

## 2024-11-28 RX ADMIN — BUDESONIDE INHALATION 500 MCG: 0.5 SUSPENSION RESPIRATORY (INHALATION) at 06:06

## 2024-11-28 RX ADMIN — OXYCODONE HYDROCHLORIDE AND ACETAMINOPHEN 1000 MG: 500 TABLET ORAL at 09:06

## 2024-11-28 RX ADMIN — FERROUS SULFATE TAB 325 MG (65 MG ELEMENTAL FE) 325 MG: 325 (65 FE) TAB at 09:06

## 2024-11-28 RX ADMIN — BUDESONIDE INHALATION 500 MCG: 0.5 SUSPENSION RESPIRATORY (INHALATION) at 17:37

## 2024-11-28 RX ADMIN — LISINOPRIL 40 MG: 20 TABLET ORAL at 09:06

## 2024-11-28 RX ADMIN — Medication 1000 UNITS: at 09:05

## 2024-11-28 RX ADMIN — POLYETHYLENE GLYCOL 3350 238 G: 17 POWDER, FOR SOLUTION ORAL at 16:01

## 2024-11-28 RX ADMIN — Medication 400 UNITS: at 09:15

## 2024-11-28 RX ADMIN — ARFORMOTEROL TARTRATE 15 MCG: 15 SOLUTION RESPIRATORY (INHALATION) at 06:06

## 2024-11-28 RX ADMIN — SODIUM CHLORIDE, PRESERVATIVE FREE 10 ML: 5 INJECTION INTRAVENOUS at 09:07

## 2024-11-28 RX ADMIN — METOPROLOL SUCCINATE 25 MG: 25 TABLET, EXTENDED RELEASE ORAL at 09:05

## 2024-11-28 RX ADMIN — ARFORMOTEROL TARTRATE 15 MCG: 15 SOLUTION RESPIRATORY (INHALATION) at 17:37

## 2024-11-28 RX ADMIN — GABAPENTIN 300 MG: 300 CAPSULE ORAL at 21:26

## 2024-11-28 RX ADMIN — Medication 1 TABLET: at 09:05

## 2024-11-28 RX ADMIN — ATORVASTATIN CALCIUM 20 MG: 20 TABLET, FILM COATED ORAL at 21:26

## 2024-11-28 RX ADMIN — BISACODYL 10 MG: 5 TABLET, COATED ORAL at 16:00

## 2024-11-28 RX ADMIN — IPRATROPIUM BROMIDE AND ALBUTEROL SULFATE 1 DOSE: .5; 2.5 SOLUTION RESPIRATORY (INHALATION) at 06:06

## 2024-11-28 RX ADMIN — GABAPENTIN 300 MG: 300 CAPSULE ORAL at 09:06

## 2024-11-28 RX ADMIN — PANTOPRAZOLE SODIUM 40 MG: 40 INJECTION, POWDER, FOR SOLUTION INTRAVENOUS at 00:55

## 2024-11-28 RX ADMIN — PYRIDOXINE HCL TAB 50 MG 100 MG: 50 TAB at 09:04

## 2024-11-28 RX ADMIN — SODIUM CHLORIDE, PRESERVATIVE FREE 10 ML: 5 INJECTION INTRAVENOUS at 21:27

## 2024-11-28 RX ADMIN — PANTOPRAZOLE SODIUM 40 MG: 40 INJECTION, POWDER, FOR SOLUTION INTRAVENOUS at 13:23

## 2024-11-28 RX ADMIN — CYANOCOBALAMIN TAB 1000 MCG 2000 MCG: 1000 TAB at 21:26

## 2024-11-28 RX ADMIN — CYANOCOBALAMIN TAB 1000 MCG 2000 MCG: 1000 TAB at 09:04

## 2024-11-28 NOTE — PROGRESS NOTES
Progress Note  Date:2024       Room:22/0622-02  Patient Name:Monica Epperson     YOB: 1950     Age:74 y.o.        Subjective    Subjective:  Symptoms:  Stable.  No shortness of breath, cough, chest pain, weakness or diarrhea.  (On O2 , she is on chronic o2 3-4 L , feels ok ,  complaints of numbness in the right LL ).    Diet:  Adequate intake.  No nausea.    Activity level: Normal.       Review of Systems   Constitutional:  Negative for appetite change, chills, fatigue, fever and unexpected weight change.   HENT:  Negative for congestion, facial swelling, mouth sores, rhinorrhea and sinus pain.    Eyes:  Negative for visual disturbance.   Respiratory:  Negative for cough, chest tightness, shortness of breath and wheezing.    Cardiovascular:  Negative for chest pain and leg swelling.   Gastrointestinal:  Negative for abdominal distention, abdominal pain, blood in stool, constipation, diarrhea and nausea.   Genitourinary:  Negative for difficulty urinating, dysuria, frequency and urgency.   Musculoskeletal:  Negative for joint swelling.   Skin:  Negative for rash.   Neurological:  Positive for numbness. Negative for dizziness, syncope, weakness, light-headedness and headaches.        Right leg from knee down   Psychiatric/Behavioral:  Negative for behavioral problems, confusion and hallucinations.      Objective         Vitals Last 24 Hours:  TEMPERATURE:  Temp  Av.4 °F (36.9 °C)  Min: 97.7 °F (36.5 °C)  Max: 98.7 °F (37.1 °C)  RESPIRATIONS RANGE: Resp  Av.8  Min: 15  Max: 22  PULSE OXIMETRY RANGE: SpO2  Av.8 %  Min: 95 %  Max: 100 %  PULSE RANGE: Pulse  Av.9  Min: 64  Max: 80  BLOOD PRESSURE RANGE: Systolic (24hrs), Av , Min:97 , Max:144   ; Diastolic (24hrs), Av, Min:54, Max:74    I/O (24Hr):    Intake/Output Summary (Last 24 hours) at 2024 1542  Last data filed at 2024 1315  Gross per 24 hour   Intake 791.67 ml   Output --   Net 791.67 ml  was performed without the administration of intravenous contrast. Multiplanar reformatted images are provided for review. Automated exposure control, iterative reconstruction, and/or weight based adjustment of the mA/kV was utilized to reduce the radiation dose to as low as reasonably achievable. COMPARISON: None. HISTORY: ORDERING SYSTEM PROVIDED HISTORY: acute renal failure TECHNOLOGIST PROVIDED HISTORY: Additional Contrast?->None Reason for exam:->acute renal failure Decision Support Exception - unselect if not a suspected or confirmed emergency medical condition->Emergency Medical Condition (MA) FINDINGS: LOWER CHEST: Visualized lung bases are clear. LIVER: Unremarkable. BILIARY: Cholelithiasis.  No biliary dilatation. SPLEEN: Unremarkable. PANCREAS: Grossly unremarkable. ADRENALS: Unremarkable. KIDNEYS: No hydronephrosis.  Calcifications bilaterally favored to be vascular but cannot exclude a nonobstructive caliceal calculi.  Mild perinephric stranding, a nonspecific finding. GI: No bowel obstruction.  Appendix not identified.  Mild colonic diverticulosis.  No pneumoperitoneum. LYMPH NODES: No significant lymphadenopathy. VESSELS: Abdominal aorta is nonaneurysmal.  Moderate vascular calcification. PELVIS: Bladder is distended.  Uterus is absent. BONES: Compression L5 of indeterminate chronicity. ADDITIONAL FINDINGS: None.     No hydronephrosis.  Distended urinary bladder. Additional observations as above.     CT HEAD WO CONTRAST    Result Date: 11/25/2024  EXAMINATION: CT OF THE HEAD WITHOUT CONTRAST  11/25/2024 8:58 pm TECHNIQUE: CT of the head was performed without the administration of intravenous contrast. Automated exposure control, iterative reconstruction, and/or weight based adjustment of the mA/kV was utilized to reduce the radiation dose to as low as reasonably achievable. COMPARISON: None. HISTORY: ORDERING SYSTEM PROVIDED HISTORY: leg paresthesia TECHNOLOGIST PROVIDED HISTORY: Has a \"code stroke\" or

## 2024-11-28 NOTE — PROGRESS NOTES
PROGRESS NOTE  By Hamilton Morris M.D.    [facility]  Dr. Cindy Whitten M.D.,  Dr. Bobby Serna M.D.,   Dr. Ashutosh Carmichael D.O.,  Dr. Andre Marinelli M.D.,  ALVA FinchO.,          Monica PARKER Zhou  74 y.o.  female    SUBJECTIVE:  Denies abdominal pain.  Denies nausea vomiting.    OBJECTIVE:    /71   Pulse 80   Temp 98.5 °F (36.9 °C) (Oral)   Resp 22   Ht 1.6 m (5' 3\")   Wt 63.5 kg (140 lb)   SpO2 98%   BMI 24.80 kg/m²     General: NAD/adult  female.  AAOx3  HEENT: Anicteric sclera/moist oral mucosa  Neck: Supple with trachea midline  Chest: CTAB/symmetric excursions  Cor: Regular/S1-S2  Abd.: Soft/nontender and nondistended  Extr.:  No significant peripheral edema.  No cyanosis or clubbing  Skin: Warm and dry/anicteric      DATA:    Monitor data reviewed -sinus rhythm noted.       Lab Results   Component Value Date/Time    WBC 6.4 11/28/2024 06:43 AM    RBC 2.60 11/28/2024 06:43 AM    HGB 7.9 11/28/2024 06:43 AM    HCT 25.2 11/28/2024 06:43 AM    MCV 96.9 11/28/2024 06:43 AM    MCH 30.4 11/28/2024 06:43 AM    MCHC 31.3 11/28/2024 06:43 AM    RDW 18.8 11/28/2024 06:43 AM     11/28/2024 06:43 AM    MPV 9.7 11/28/2024 06:43 AM     Lab Results   Component Value Date/Time     11/27/2024 08:00 AM    K 4.0 11/27/2024 08:00 AM     11/27/2024 08:00 AM    CO2 29 11/27/2024 08:00 AM    BUN 32 11/27/2024 08:00 AM    CREATININE 2.0 11/27/2024 08:00 AM    CALCIUM 9.7 11/27/2024 08:00 AM    BILITOT 0.3 11/27/2024 08:00 AM    ALKPHOS 56 11/27/2024 08:00 AM    AST 22 11/27/2024 08:00 AM    ALT 14 11/27/2024 08:00 AM     No results found for: \"LIPASE\"  No results found for: \"AMYLASE\"      ASSESSMENT/PLAN:  Patient Active Problem List   Diagnosis    Chest pain    Atypical chest pain    Essential hypertension    Mixed hyperlipidemia    Chronic obstructive pulmonary disease (HCC)    Subcutaneous emphysema resulting from a procedure    Ex-smoker for more than 1 year

## 2024-11-28 NOTE — PROGRESS NOTES
OCCUPATIONAL THERAPY INITIAL EVALUATION    [facility]  [address] [place]        Date:2024                                                  Patient Name: Monica Epperson    MRN: [id number]    : 1950    Room: [address] [place] [address]      Evaluating OT: Angel Andres OTR/L; #[id number]     Referring Provider and Specific Provider Orders/Date:      24  OT eval and treat  Start:  24,   End:  24,   ONE TIME,   Standing Count:  1 Occurrences,   R         Salome Carbone DO      Placement Recommendation: Home       Diagnosis:   1. Gastrointestinal hemorrhage, unspecified gastrointestinal hemorrhage type    2. Anemia, unspecified type    3. Acute renal failure, unspecified acute renal failure type (HCC)    4. Paresthesia    5. PVD (peripheral vascular disease) (HCC)    6. Numbness and tingling of right leg         Surgery:     Procedure: ESOPHAGOGASTRODUODENOSCOPY BIOPSY         Pertinent Medical History:       Past Medical History:   Diagnosis Date    Anemia     Blood transfusion 2004    16 units plus BLEEDING ULCER    Cancer (HCC) 1971    uterus    Chronic obstructive pulmonary disease (HCC)     Hx of blood clots     LEG ON PLAVIX    Hyperlipidemia     Hypertension     PVD (peripheral vascular disease) (HCC)          Past Surgical History:   Procedure Laterality Date    ABDOMEN SURGERY      repair bleeding ulcer    APPENDECTOMY      CARDIAC CATHETERIZATION  2018    Dr. Vasquez    COLON SURGERY      6 inches colon removed    COLONOSCOPY      ENDOSCOPY, COLON, DIAGNOSTIC      HYSTERECTOMY (CERVIX STATUS UNKNOWN)      OTHER SURGICAL HISTORY      blood clot removed from rt. leg x 2     SPINE SURGERY N/A 2020    KYPHOPLASTY T7, T8 performed by Philip Shaw DO at [facility] OR    TONGUE BIOPSY      x 2    TRANSLUMINAL ANGIOPLASTY Right 4/15/15    stent Rt  distal SFA    VASCULAR SURGERY  2009    stent rt. leg     participation in functional tasks   LUE WFL 4/5 good  and wfl FMC/dexterity noted during ADL tasks   Improve overall LUE strength  for participation in functional tasks      Vitals:   HR with activity: 78 bpm     SpO2 with activity: 93%      Hearing: WFL   Sensation:  No c/o numbness or tingling  Tone: WFL   Edema: None    Comments: Nursing approved therapy session. Upon arrival the patient was supine with HOB elevated.  At end of session, patient was seated in bedside chair with call light and phone within reach, all lines and tubes intact.  Overall patient demonstrated decreased independence and safety during completion of ADL/functional transfer/mobility tasks.  Pt would benefit from continued skilled OT to increase safety and independence with completion of ADL/IADL tasks for functional independence and quality of life.    Treatment: OT treatment provided this date includes:   Instruction/training on safety and adapted techniques for completion of ADLs   Instruction/training on safe functional mobility/transfer techniques   Instruction/training on energy conservation/work simplification for completion of ADLs   Instruction/training on proper positioning/alignment to prevent contractures    Neuromuscular Reeducation to facilitate balance/righting reactions for increased function with ADLs tasks    Treatment:      Functional transfers: Facilitated transfers to/from EOB and chair with cues for body alignment, safety and hand placement.  ADL completion: Self-care retraining for the above-mentioned ADLs; training on proper hand placement, safety technique, sequencing, and energy conservation techniques.  Postural Balance: Sitting/standing balance retraining to improve righting reactions with postural changes during ADLs.        Rehab Potential: Good for established goals.      Patient / Family Goal: Patient would like to return home and return to [facility] re: ADLs and IADLs        Patient and/or family were

## 2024-11-28 NOTE — PLAN OF CARE
Problem: Discharge Planning  Goal: Discharge to home or other facility with appropriate resources  11/28/2024 1103 by Sharmaine Stock RN  Outcome: Progressing  Flowsheets (Taken 11/27/2024 1011 by Ranjan Joel RN)  Discharge to home or other facility with appropriate resources:   Identify barriers to discharge with patient and caregiver   Arrange for needed discharge resources and transportation as appropriate   Identify discharge learning needs (meds, wound care, etc)     Problem: ABCDS Injury Assessment  Goal: Absence of physical injury  11/28/2024 1103 by Sharmaine Stock RN  Outcome: Progressing  Flowsheets (Taken 11/27/2024 1011 by Ranjan Joel RN)  Absence of Physical Injury: Implement safety measures based on patient assessment     Problem: Safety - Adult  Goal: Free from fall injury  11/28/2024 1103 by Sharmaine Stock RN  Outcome: Progressing  Flowsheets (Taken 11/27/2024 1011 by Ranjan Joel, RN)  Free From Fall Injury: Instruct family/caregiver on patient safety

## 2024-11-29 ENCOUNTER — ANESTHESIA (OUTPATIENT)
Dept: ENDOSCOPY | Age: 74
End: 2024-11-29
Payer: MEDICARE

## 2024-11-29 LAB
ABO/RH: NORMAL
ALBUMIN SERPL-MCNC: 3.4 G/DL (ref 3.5–5.2)
ALP SERPL-CCNC: 62 U/L (ref 35–104)
ALT SERPL-CCNC: 15 U/L (ref 0–32)
ANION GAP SERPL CALCULATED.3IONS-SCNC: 8 MMOL/L (ref 7–16)
ANTIBODY SCREEN: NEGATIVE
ARM BAND NUMBER: NORMAL
AST SERPL-CCNC: 21 U/L (ref 0–31)
BASOPHILS # BLD: 0.02 K/UL (ref 0–0.2)
BASOPHILS NFR BLD: 0 % (ref 0–2)
BILIRUB SERPL-MCNC: 0.4 MG/DL (ref 0–1.2)
BLOOD BANK BLOOD PRODUCT EXPIRATION DATE: NORMAL
BLOOD BANK DISPENSE STATUS: NORMAL
BLOOD BANK ISBT PRODUCT BLOOD TYPE: 5100
BLOOD BANK ISBT PRODUCT BLOOD TYPE: 5100
BLOOD BANK ISBT PRODUCT BLOOD TYPE: 9500
BLOOD BANK PRODUCT CODE: NORMAL
BLOOD BANK SAMPLE EXPIRATION: NORMAL
BLOOD BANK UNIT TYPE AND RH: NORMAL
BPU ID: NORMAL
BUN SERPL-MCNC: 14 MG/DL (ref 6–23)
CALCIUM SERPL-MCNC: 8.8 MG/DL (ref 8.6–10.2)
CHLORIDE SERPL-SCNC: 107 MMOL/L (ref 98–107)
CO2 SERPL-SCNC: 26 MMOL/L (ref 22–29)
COMPONENT: NORMAL
CREAT SERPL-MCNC: 1.5 MG/DL (ref 0.5–1)
CROSSMATCH RESULT: NORMAL
EOSINOPHIL # BLD: 0.24 K/UL (ref 0.05–0.5)
EOSINOPHILS RELATIVE PERCENT: 4 % (ref 0–6)
ERYTHROCYTE [DISTWIDTH] IN BLOOD BY AUTOMATED COUNT: 18.8 % (ref 11.5–15)
GFR, ESTIMATED: 36 ML/MIN/1.73M2
GLUCOSE SERPL-MCNC: 84 MG/DL (ref 74–99)
HCT VFR BLD AUTO: 25.4 % (ref 34–48)
HCT VFR BLD AUTO: 27.5 % (ref 34–48)
HGB BLD-MCNC: 8.1 G/DL (ref 11.5–15.5)
HGB BLD-MCNC: 9.1 G/DL (ref 11.5–15.5)
IMM GRANULOCYTES # BLD AUTO: <0.03 K/UL (ref 0–0.58)
IMM GRANULOCYTES NFR BLD: 0 % (ref 0–5)
LYMPHOCYTES NFR BLD: 1.31 K/UL (ref 1.5–4)
LYMPHOCYTES RELATIVE PERCENT: 24 % (ref 20–42)
MCH RBC QN AUTO: 30.8 PG (ref 26–35)
MCHC RBC AUTO-ENTMCNC: 31.9 G/DL (ref 32–34.5)
MCV RBC AUTO: 96.6 FL (ref 80–99.9)
MONOCYTES NFR BLD: 0.55 K/UL (ref 0.1–0.95)
MONOCYTES NFR BLD: 10 % (ref 2–12)
NEUTROPHILS NFR BLD: 61 % (ref 43–80)
NEUTS SEG NFR BLD: 3.39 K/UL (ref 1.8–7.3)
PLATELET # BLD AUTO: 337 K/UL (ref 130–450)
PMV BLD AUTO: 9.9 FL (ref 7–12)
POTASSIUM SERPL-SCNC: 3.6 MMOL/L (ref 3.5–5)
PROT SERPL-MCNC: 5.1 G/DL (ref 6.4–8.3)
RBC # BLD AUTO: 2.63 M/UL (ref 3.5–5.5)
SODIUM SERPL-SCNC: 141 MMOL/L (ref 132–146)
TRANSFUSION STATUS: NORMAL
UNIT DIVISION: 0
UNIT ISSUE DATE/TIME: NORMAL
WBC OTHER # BLD: 5.5 K/UL (ref 4.5–11.5)

## 2024-11-29 PROCEDURE — 6360000002 HC RX W HCPCS: Performed by: NURSE ANESTHETIST, CERTIFIED REGISTERED

## 2024-11-29 PROCEDURE — 0DJD8ZZ INSPECTION OF LOWER INTESTINAL TRACT, VIA NATURAL OR ARTIFICIAL OPENING ENDOSCOPIC: ICD-10-PCS | Performed by: INTERNAL MEDICINE

## 2024-11-29 PROCEDURE — 85018 HEMOGLOBIN: CPT

## 2024-11-29 PROCEDURE — 97530 THERAPEUTIC ACTIVITIES: CPT

## 2024-11-29 PROCEDURE — 85014 HEMATOCRIT: CPT

## 2024-11-29 PROCEDURE — 80053 COMPREHEN METABOLIC PANEL: CPT

## 2024-11-29 PROCEDURE — 2580000003 HC RX 258: Performed by: INTERNAL MEDICINE

## 2024-11-29 PROCEDURE — 7100000011 HC PHASE II RECOVERY - ADDTL 15 MIN: Performed by: INTERNAL MEDICINE

## 2024-11-29 PROCEDURE — 7100000010 HC PHASE II RECOVERY - FIRST 15 MIN: Performed by: INTERNAL MEDICINE

## 2024-11-29 PROCEDURE — 3609027000 HC COLONOSCOPY: Performed by: INTERNAL MEDICINE

## 2024-11-29 PROCEDURE — 6370000000 HC RX 637 (ALT 250 FOR IP): Performed by: FAMILY MEDICINE

## 2024-11-29 PROCEDURE — 2700000000 HC OXYGEN THERAPY PER DAY

## 2024-11-29 PROCEDURE — 36415 COLL VENOUS BLD VENIPUNCTURE: CPT

## 2024-11-29 PROCEDURE — 2580000003 HC RX 258: Performed by: HOSPITALIST

## 2024-11-29 PROCEDURE — 3700000000 HC ANESTHESIA ATTENDED CARE: Performed by: INTERNAL MEDICINE

## 2024-11-29 PROCEDURE — 2709999900 HC NON-CHARGEABLE SUPPLY: Performed by: INTERNAL MEDICINE

## 2024-11-29 PROCEDURE — 3700000001 HC ADD 15 MINUTES (ANESTHESIA): Performed by: INTERNAL MEDICINE

## 2024-11-29 PROCEDURE — 94640 AIRWAY INHALATION TREATMENT: CPT

## 2024-11-29 PROCEDURE — 2580000003 HC RX 258: Performed by: NURSE ANESTHETIST, CERTIFIED REGISTERED

## 2024-11-29 PROCEDURE — 6360000002 HC RX W HCPCS: Performed by: HOSPITALIST

## 2024-11-29 PROCEDURE — 6360000002 HC RX W HCPCS: Performed by: FAMILY MEDICINE

## 2024-11-29 PROCEDURE — 85025 COMPLETE CBC W/AUTO DIFF WBC: CPT

## 2024-11-29 PROCEDURE — 6360000002 HC RX W HCPCS: Performed by: INTERNAL MEDICINE

## 2024-11-29 PROCEDURE — 99232 SBSQ HOSP IP/OBS MODERATE 35: CPT | Performed by: INTERNAL MEDICINE

## 2024-11-29 PROCEDURE — 2580000003 HC RX 258: Performed by: FAMILY MEDICINE

## 2024-11-29 PROCEDURE — 2060000000 HC ICU INTERMEDIATE R&B

## 2024-11-29 RX ORDER — ENOXAPARIN SODIUM 100 MG/ML
40 INJECTION SUBCUTANEOUS DAILY
Status: DISCONTINUED | OUTPATIENT
Start: 2024-11-29 | End: 2024-12-01 | Stop reason: HOSPADM

## 2024-11-29 RX ORDER — PROPOFOL 10 MG/ML
INJECTION, EMULSION INTRAVENOUS
Status: DISCONTINUED | OUTPATIENT
Start: 2024-11-29 | End: 2024-11-29 | Stop reason: SDUPTHER

## 2024-11-29 RX ORDER — SODIUM CHLORIDE, SODIUM LACTATE, POTASSIUM CHLORIDE, CALCIUM CHLORIDE 600; 310; 30; 20 MG/100ML; MG/100ML; MG/100ML; MG/100ML
INJECTION, SOLUTION INTRAVENOUS
Status: DISCONTINUED | OUTPATIENT
Start: 2024-11-29 | End: 2024-11-29 | Stop reason: SDUPTHER

## 2024-11-29 RX ORDER — SODIUM CHLORIDE 9 MG/ML
INJECTION, SOLUTION INTRAVENOUS CONTINUOUS
Status: ACTIVE | OUTPATIENT
Start: 2024-11-29 | End: 2024-11-29

## 2024-11-29 RX ADMIN — ENOXAPARIN SODIUM 40 MG: 100 INJECTION SUBCUTANEOUS at 13:31

## 2024-11-29 RX ADMIN — PROPOFOL 50 MG: 10 INJECTION, EMULSION INTRAVENOUS at 08:00

## 2024-11-29 RX ADMIN — PYRIDOXINE HCL TAB 50 MG 100 MG: 50 TAB at 09:48

## 2024-11-29 RX ADMIN — BUDESONIDE INHALATION 500 MCG: 0.5 SUSPENSION RESPIRATORY (INHALATION) at 18:04

## 2024-11-29 RX ADMIN — Medication 400 UNITS: at 09:48

## 2024-11-29 RX ADMIN — METOPROLOL SUCCINATE 25 MG: 25 TABLET, EXTENDED RELEASE ORAL at 09:48

## 2024-11-29 RX ADMIN — OXYCODONE HYDROCHLORIDE AND ACETAMINOPHEN 1000 MG: 500 TABLET ORAL at 09:48

## 2024-11-29 RX ADMIN — ARFORMOTEROL TARTRATE 15 MCG: 15 SOLUTION RESPIRATORY (INHALATION) at 06:22

## 2024-11-29 RX ADMIN — SODIUM CHLORIDE, POTASSIUM CHLORIDE, SODIUM LACTATE AND CALCIUM CHLORIDE: 600; 310; 30; 20 INJECTION, SOLUTION INTRAVENOUS at 07:57

## 2024-11-29 RX ADMIN — GABAPENTIN 300 MG: 300 CAPSULE ORAL at 20:46

## 2024-11-29 RX ADMIN — CYANOCOBALAMIN TAB 1000 MCG 2000 MCG: 1000 TAB at 20:46

## 2024-11-29 RX ADMIN — BUDESONIDE INHALATION 500 MCG: 0.5 SUSPENSION RESPIRATORY (INHALATION) at 06:22

## 2024-11-29 RX ADMIN — ATORVASTATIN CALCIUM 20 MG: 20 TABLET, FILM COATED ORAL at 20:46

## 2024-11-29 RX ADMIN — SODIUM CHLORIDE: 9 INJECTION, SOLUTION INTRAVENOUS at 10:01

## 2024-11-29 RX ADMIN — LISINOPRIL 40 MG: 20 TABLET ORAL at 09:48

## 2024-11-29 RX ADMIN — ARFORMOTEROL TARTRATE 15 MCG: 15 SOLUTION RESPIRATORY (INHALATION) at 18:04

## 2024-11-29 RX ADMIN — SODIUM CHLORIDE, PRESERVATIVE FREE 10 ML: 5 INJECTION INTRAVENOUS at 20:46

## 2024-11-29 RX ADMIN — GABAPENTIN 300 MG: 300 CAPSULE ORAL at 09:48

## 2024-11-29 RX ADMIN — CYANOCOBALAMIN TAB 1000 MCG 2000 MCG: 1000 TAB at 09:48

## 2024-11-29 RX ADMIN — PANTOPRAZOLE SODIUM 40 MG: 40 INJECTION, POWDER, FOR SOLUTION INTRAVENOUS at 13:31

## 2024-11-29 RX ADMIN — PROPOFOL 30 MG: 10 INJECTION, EMULSION INTRAVENOUS at 08:03

## 2024-11-29 RX ADMIN — Medication 1000 UNITS: at 09:48

## 2024-11-29 RX ADMIN — PROPOFOL 30 MG: 10 INJECTION, EMULSION INTRAVENOUS at 08:06

## 2024-11-29 RX ADMIN — PANTOPRAZOLE SODIUM 40 MG: 40 INJECTION, POWDER, FOR SOLUTION INTRAVENOUS at 00:06

## 2024-11-29 RX ADMIN — Medication 1 TABLET: at 09:48

## 2024-11-29 NOTE — CARE COORDINATION
SOCIAL WORK / DISCHARGE PLANNING:  Sw met with pt at bedside. Has had EGD and Colonoscopy completed. Pt is hopeful for dc next date. She plans to return home, no dc needs noted at this time. Pt ambulatory, has home O2 and family will provide home going transport.               Electronically signed by DESTINY Patrick on 11/29/2024 at 3:28 PM

## 2024-11-29 NOTE — PROGRESS NOTES
Physical Therapy  Physical Therapy    Room #:   ENDO POOL ROOM/NONE    Date: 2024       Patient Name: Monica Epperson  : 1950      MRN: [id number]     Patient unavailable for physical therapy treatment due to off floor at colonoscopy . Will attempt PT treatment at a later time. Thank you.      Jef Guo  hospitals  LIC # [id number]

## 2024-11-29 NOTE — ANESTHESIA PRE PROCEDURE
Department of Anesthesiology  Preprocedure Note       Name:  Monica Epperson   Age:  74 y.o.  :  1950                                          MRN:  [id number]         Date:  2024      Surgeon: Surgeon(s):  Hector Pack MD    Procedure: Procedure(s):  COLONOSCOPY DIAGNOSTIC    Medications prior to admission:   Prior to Admission medications    Medication Sig Start Date End Date Taking? Authorizing Provider   fluticasone-umeclidin-vilant (TRELEGY ELLIPTA) 100-62.5-25 MCG/INH AEPB Inhale 1 puff into the lungs Daily with lunch BRING    Reynaldo Hernandez MD   metoprolol succinate (TOPROL XL) 25 MG extended release tablet Take 1 tablet by mouth daily 18   Malcolm Saba MD   albuterol sulfate  (90 Base) MCG/ACT inhaler Inhale 2 puffs into the lungs every 6 hours as needed for Wheezing    Reynaldo Hernandez MD   calcium carbonate-vitamin D (CALTRATE) 600-400 MG-UNIT TABS per tab Take 1 tablet by mouth daily    Reynaldo Hernandez MD   loratadine (CLARITIN) 10 MG tablet Take 10 mg by mouth daily as needed     Reynaldo Hernandez MD   gabapentin (NEURONTIN) 300 MG capsule Take 300 mg by mouth 2 times daily. .    Reynaldo Hernandez MD   vitamin E 400 UNIT capsule Take 400 Units by mouth daily.    Reynaldo Hernandez MD   pyridoxine (B-6) 100 MG tablet Take 100 mg by mouth daily.    Reynaldo Hernandez MD   Ascorbic Acid (VITAMIN C) 250 MG tablet Take 1,000 mg by mouth daily.    Reynaldo Hernandez MD   clopidogrel (PLAVIX) 75 MG tablet Take 75 mg by mouth daily.      Reynaldo Hernandez MD   lisinopril (PRINIVIL;ZESTRIL) 40 MG tablet Take 40 mg by mouth daily.    Reynaldo Hernandez MD   lovastatin (MEVACOR) 40 MG tablet Take 40 mg by mouth nightly.      Reynaldo Hernandez MD   ferrous sulfate 325 (65 FE) MG tablet Take 325 mg by mouth daily (with breakfast)     Reynaldo Hernandez MD   BABY ASPIRIN PO Take 1 tablet by mouth daily. 81 mg    David 
clear to auscultation  (+)  COPD:          asthma:                            Cardiovascular:    (+) hypertension:, hyperlipidemia        Rhythm: regular  Rate: normal           Beta Blocker:  Dose within 24 Hrs         Neuro/Psych:   Negative Neuro/Psych ROS              GI/Hepatic/Renal: Neg GI/Hepatic/Renal ROS  (+) renal disease: CRI          Endo/Other:    (+) blood dyscrasia: anticoagulation therapy:., malignancy/cancer.                 Abdominal:       Abdomen: soft.      Vascular:          Other Findings:             Anesthesia Plan      MAC     ASA 3     (Multiple blood transfusions HGB 8.1)  Induction: intravenous.      Anesthetic plan and risks discussed with patient.      Plan discussed with CRNA.                    HOSSEIN GODINEZ MD   11/29/2024

## 2024-11-29 NOTE — PLAN OF CARE
Problem: Discharge Planning  Goal: Discharge to home or other facility with appropriate resources  Outcome: Progressing  Flowsheets (Taken 11/29/2024 7953)  Discharge to home or other facility with appropriate resources: Identify barriers to discharge with patient and caregiver     Problem: ABCDS Injury Assessment  Goal: Absence of physical injury  Outcome: Progressing     Problem: Safety - Adult  Goal: Free from fall injury  Outcome: Progressing

## 2024-11-29 NOTE — PLAN OF CARE
Problem: Discharge Planning  Goal: Discharge to home or other facility with appropriate resources  11/28/2024 2255 by Rachel Auguste RN  Outcome: Progressing  11/28/2024 1103 by Sharmaine Stock RN  Outcome: Progressing  Flowsheets (Taken 11/27/2024 1011 by Ranjan Joel, RN)  Discharge to home or other facility with appropriate resources:   Identify barriers to discharge with patient and caregiver   Arrange for needed discharge resources and transportation as appropriate   Identify discharge learning needs (meds, wound care, etc)     Problem: ABCDS Injury Assessment  Goal: Absence of physical injury  11/28/2024 2255 by Rachel Auguste RN  Outcome: Progressing  11/28/2024 1103 by Sharmaine Stock RN  Outcome: Progressing  Flowsheets (Taken 11/27/2024 1011 by Ranjan Joel, RN)  Absence of Physical Injury: Implement safety measures based on patient assessment     Problem: Safety - Adult  Goal: Free from fall injury  11/28/2024 2255 by Rachel Auguste RN  Outcome: Progressing  11/28/2024 1103 by Sharmaine Stock RN  Outcome: Progressing  Flowsheets (Taken 11/27/2024 1011 by Ranjan Joel RN)  Free From Fall Injury: Instruct family/caregiver on patient safety

## 2024-11-29 NOTE — ANESTHESIA POSTPROCEDURE EVALUATION
Department of Anesthesiology  Postprocedure Note    Patient: Monica Epperson  MRN: [id number]  YOB: 1950  Date of evaluation: 11/29/2024    Procedure Summary       Date: 11/29/24 Room / Location: [address]    Anesthesia Start: 0757 Anesthesia Stop: 0817    Procedure: COLONOSCOPY DIAGNOSTIC Diagnosis:       Anemia, unspecified type      (Anemia, unspecified type [D64.9])    Surgeons: Hector Pack MD Responsible Provider: Ranjan Vences MD    Anesthesia Type: MAC ASA Status: 3            Anesthesia Type: No value filed.    Anne Phase I:      Anne Phase II: Anne Score: 9    Anesthesia Post Evaluation    Patient location during evaluation: bedside  Patient participation: complete - patient participated  Level of consciousness: awake  Pain score: 2  Airway patency: patent  Nausea & Vomiting: no nausea  Cardiovascular status: blood pressure returned to baseline  Respiratory status: acceptable  Hydration status: euvolemic  Pain management: adequate    No notable events documented.

## 2024-11-29 NOTE — OP NOTE
Operative Note      Patient: Monica Epperson  YOB: 1950  MRN: [id number]    Date of Procedure: 11/29/2024    Pre-Op Diagnosis Codes:      * Anemia, unspecified type [D64.9]    Post-Op Diagnosis: Diverticular disease of the colon.       Procedure(s):  COLONOSCOPY DIAGNOSTIC    Surgeon(s):  Hector Pack MD    Assistant:   Surgical Assistant: Salome Fang RN    Anesthesia: Monitor Anesthesia Care    Estimated Blood Loss (mL): None    Complications: None    Specimens:   * No specimens in log *    Implants:  * No implants in log *      Drains: * No LDAs found *    Findings:  Infection Present At Time Of Surgery (PATOS) (choose all levels that have infection present):  None  Other Findings: None    Detailed Description of Procedure:   74-year-old female patient, with acute GI blood loss anemia.    Digital rectal exam: Anatomic.  The colonoscope was introduced to the anus and advanced gently to the cecum.  The prep was good.  The mucosa was normal.  Multiple diverticular openings in the left side of the colon, likely the site of the recent hemorrhagic episode.  Normal hemorrhoidal cushion.    Electronically signed by Hector Pack MD on 11/29/2024 at 7:52 AM

## 2024-11-29 NOTE — PROGRESS NOTES
Patient seen and examined prior to procedure.  Reports clear stool without blood.  Denies any significant abdominal pain.    H&P reviewed -no new changes except as described above.  Vitals:    11/29/24 0622   BP:    Pulse:    Resp:    Temp:    SpO2: 99%      Plan: Proceed with colonoscopy later today.  Further recommendations including such for video capsule endoscopy as outpatient are to follow.    Hamilton Morris MD

## 2024-11-29 NOTE — PROGRESS NOTES
Progress Note  Date:2024       Room:22/0622-02  Patient Name:Monica Epperson     YOB: 1950     Age:74 y.o.        Subjective    Subjective:  Symptoms:  Stable.  No shortness of breath, cough, chest pain, weakness or diarrhea.  (On O2 , she is on chronic o2 3-4 L , feels ok ,  complaints of numbness in the right LL ).    Diet:  Adequate intake.  No nausea.    Activity level: Normal.       Review of Systems   Constitutional:  Negative for appetite change, chills, fatigue, fever and unexpected weight change.   HENT:  Negative for congestion, facial swelling, mouth sores, rhinorrhea and sinus pain.    Eyes:  Negative for visual disturbance.   Respiratory:  Negative for cough, chest tightness, shortness of breath and wheezing.    Cardiovascular:  Negative for chest pain and leg swelling.   Gastrointestinal:  Negative for abdominal distention, abdominal pain, blood in stool, constipation, diarrhea and nausea.   Genitourinary:  Negative for difficulty urinating, dysuria, frequency and urgency.   Musculoskeletal:  Negative for joint swelling.   Skin:  Negative for rash.   Neurological:  Positive for numbness. Negative for dizziness, syncope, weakness, light-headedness and headaches.        Right leg from knee down   Psychiatric/Behavioral:  Negative for behavioral problems, confusion and hallucinations.      Objective         Vitals Last 24 Hours:  TEMPERATURE:  Temp  Av.1 °F (36.7 °C)  Min: 97.1 °F (36.2 °C)  Max: 98.7 °F (37.1 °C)  RESPIRATIONS RANGE: Resp  Av.8  Min: 16  Max: 20  PULSE OXIMETRY RANGE: SpO2  Av.9 %  Min: 94 %  Max: 100 %  PULSE RANGE: Pulse  Av.8  Min: 59  Max: 75  BLOOD PRESSURE RANGE: Systolic (24hrs), Av , Min:119 , Max:141   ; Diastolic (24hrs), Av, Min:57, Max:76    I/O (24Hr):    Intake/Output Summary (Last 24 hours) at 2024 1244  Last data filed at 2024 0811  Gross per 24 hour   Intake 220 ml   Output --   Net 220 ml

## 2024-11-29 NOTE — PROGRESS NOTES
Physical Therapy Treatment Note/Plan of Care    Room #:  0622/0622-02  Patient Name: Monica Epperson  YOB: 1950  MRN: [id number]    Date of Service: 11/29/2024     Tentative placement recommendation: Home with no Physical Therapy needs  Equipment recommendation: Cane  family to provide    Evaluating Physical Therapist: Mychal Cassidy, PT  #[id number]      Specific Provider Orders/Date/Referring Provider :     PT evaluation and treat  Start:  11/26/24 0015,   End:  11/26/24 0015,   ONE TIME,   Standing Count:  1 Occurrences,   R       Salome Carbone,     Admitting Diagnosis:   Paresthesia [R20.2]  GI bleed [K92.2]  Acute renal failure, unspecified acute renal failure type (HCC) [N17.9]  Gastrointestinal hemorrhage, unspecified gastrointestinal hemorrhage type [K92.2]  Anemia, unspecified type [D64.9]     Admitted with    Right lower extremity numb/tingling, low h/h   Surgery: none  Visit Diagnoses         Codes    Anemia, unspecified type     D64.9    Acute renal failure, unspecified acute renal failure type (HCC)     N17.9    Paresthesia     R20.2    Numbness and tingling of right leg     R20.0, R20.2            Patient Active Problem List   Diagnosis    Chest pain    Atypical chest pain    Essential hypertension    Mixed hyperlipidemia    Chronic obstructive pulmonary disease (HCC)    Subcutaneous emphysema resulting from a procedure    Ex-smoker for more than 1 year    Uterine carcinoma (HCC)    GI bleed    MAGDALENA (acute kidney injury) ([facility])    Chronic hypoxemic respiratory failure    PVD (peripheral vascular disease) ([facility])        ASSESSMENT of Current Deficits  Patient using a single point cane during ambulation with no loss of balance or unsteadiness noted.  Patient with difficulty oxygenating impairing endurance and functional independence requiring 3 Liters of o2 via nasal cannula to maintain > 90% po2. Patient needing a seated rest period due to shortness of breath and impaired endurance. Pt  the bed.      Therapeutic Exercises:  not performed      At end of session, patient sitting edge of bed with  .  call light and phone within reach,  all lines and tubes intact, nursing notified.      Patient would benefit from continued skilled Physical Therapy to improve functional independence and quality of life.         Patient's/ family goals   home    Time in  14:20  Time out 14:35    Total Treatment Time  15 minutes        CPT codes:    Therapeutic activities (02450)   15 minutes  1 unit(s)    Jef Guo  [facility]  LIC # [id number]

## 2024-11-30 LAB
ALBUMIN SERPL-MCNC: 3.2 G/DL (ref 3.5–5.2)
ALP SERPL-CCNC: 62 U/L (ref 35–104)
ALT SERPL-CCNC: 14 U/L (ref 0–32)
ANION GAP SERPL CALCULATED.3IONS-SCNC: 6 MMOL/L (ref 7–16)
AST SERPL-CCNC: 19 U/L (ref 0–31)
BASOPHILS # BLD: 0.02 K/UL (ref 0–0.2)
BASOPHILS NFR BLD: 0 % (ref 0–2)
BILIRUB SERPL-MCNC: 0.3 MG/DL (ref 0–1.2)
BUN SERPL-MCNC: 12 MG/DL (ref 6–23)
CALCIUM SERPL-MCNC: 8.6 MG/DL (ref 8.6–10.2)
CHLORIDE SERPL-SCNC: 109 MMOL/L (ref 98–107)
CO2 SERPL-SCNC: 26 MMOL/L (ref 22–29)
CREAT SERPL-MCNC: 1.5 MG/DL (ref 0.5–1)
EOSINOPHIL # BLD: 0.18 K/UL (ref 0.05–0.5)
EOSINOPHILS RELATIVE PERCENT: 4 % (ref 0–6)
ERYTHROCYTE [DISTWIDTH] IN BLOOD BY AUTOMATED COUNT: 18.1 % (ref 11.5–15)
GFR, ESTIMATED: 36 ML/MIN/1.73M2
GLUCOSE SERPL-MCNC: 82 MG/DL (ref 74–99)
HCT VFR BLD AUTO: 25.8 % (ref 34–48)
HGB BLD-MCNC: 8 G/DL (ref 11.5–15.5)
IMM GRANULOCYTES # BLD AUTO: <0.03 K/UL (ref 0–0.58)
IMM GRANULOCYTES NFR BLD: 0 % (ref 0–5)
LYMPHOCYTES NFR BLD: 1.33 K/UL (ref 1.5–4)
LYMPHOCYTES RELATIVE PERCENT: 28 % (ref 20–42)
MCH RBC QN AUTO: 31 PG (ref 26–35)
MCHC RBC AUTO-ENTMCNC: 31 G/DL (ref 32–34.5)
MCV RBC AUTO: 100 FL (ref 80–99.9)
MONOCYTES NFR BLD: 0.55 K/UL (ref 0.1–0.95)
MONOCYTES NFR BLD: 11 % (ref 2–12)
NEUTROPHILS NFR BLD: 57 % (ref 43–80)
NEUTS SEG NFR BLD: 2.74 K/UL (ref 1.8–7.3)
PLATELET # BLD AUTO: 318 K/UL (ref 130–450)
PMV BLD AUTO: 10.2 FL (ref 7–12)
POTASSIUM SERPL-SCNC: 3.9 MMOL/L (ref 3.5–5)
PROT SERPL-MCNC: 5.1 G/DL (ref 6.4–8.3)
RBC # BLD AUTO: 2.58 M/UL (ref 3.5–5.5)
SODIUM SERPL-SCNC: 141 MMOL/L (ref 132–146)
WBC OTHER # BLD: 4.8 K/UL (ref 4.5–11.5)

## 2024-11-30 PROCEDURE — 2580000003 HC RX 258: Performed by: HOSPITALIST

## 2024-11-30 PROCEDURE — 99232 SBSQ HOSP IP/OBS MODERATE 35: CPT | Performed by: INTERNAL MEDICINE

## 2024-11-30 PROCEDURE — 85025 COMPLETE CBC W/AUTO DIFF WBC: CPT

## 2024-11-30 PROCEDURE — 80053 COMPREHEN METABOLIC PANEL: CPT

## 2024-11-30 PROCEDURE — 2700000000 HC OXYGEN THERAPY PER DAY

## 2024-11-30 PROCEDURE — 6360000002 HC RX W HCPCS: Performed by: HOSPITALIST

## 2024-11-30 PROCEDURE — 36415 COLL VENOUS BLD VENIPUNCTURE: CPT

## 2024-11-30 PROCEDURE — 6370000000 HC RX 637 (ALT 250 FOR IP): Performed by: FAMILY MEDICINE

## 2024-11-30 PROCEDURE — 2580000003 HC RX 258: Performed by: FAMILY MEDICINE

## 2024-11-30 PROCEDURE — 94640 AIRWAY INHALATION TREATMENT: CPT

## 2024-11-30 PROCEDURE — 6360000002 HC RX W HCPCS: Performed by: INTERNAL MEDICINE

## 2024-11-30 PROCEDURE — 6360000002 HC RX W HCPCS: Performed by: FAMILY MEDICINE

## 2024-11-30 PROCEDURE — 2060000000 HC ICU INTERMEDIATE R&B

## 2024-11-30 RX ADMIN — LISINOPRIL 40 MG: 20 TABLET ORAL at 08:46

## 2024-11-30 RX ADMIN — PANTOPRAZOLE SODIUM 40 MG: 40 INJECTION, POWDER, FOR SOLUTION INTRAVENOUS at 00:07

## 2024-11-30 RX ADMIN — GABAPENTIN 300 MG: 300 CAPSULE ORAL at 20:09

## 2024-11-30 RX ADMIN — OXYCODONE HYDROCHLORIDE AND ACETAMINOPHEN 1000 MG: 500 TABLET ORAL at 08:45

## 2024-11-30 RX ADMIN — CYANOCOBALAMIN TAB 1000 MCG 2000 MCG: 1000 TAB at 20:09

## 2024-11-30 RX ADMIN — ENOXAPARIN SODIUM 40 MG: 100 INJECTION SUBCUTANEOUS at 08:47

## 2024-11-30 RX ADMIN — PYRIDOXINE HCL TAB 50 MG 100 MG: 50 TAB at 08:47

## 2024-11-30 RX ADMIN — ARFORMOTEROL TARTRATE 15 MCG: 15 SOLUTION RESPIRATORY (INHALATION) at 06:28

## 2024-11-30 RX ADMIN — Medication 1000 UNITS: at 08:46

## 2024-11-30 RX ADMIN — BUDESONIDE INHALATION 500 MCG: 0.5 SUSPENSION RESPIRATORY (INHALATION) at 18:16

## 2024-11-30 RX ADMIN — Medication 400 UNITS: at 08:46

## 2024-11-30 RX ADMIN — PANTOPRAZOLE SODIUM 40 MG: 40 INJECTION, POWDER, FOR SOLUTION INTRAVENOUS at 12:14

## 2024-11-30 RX ADMIN — GABAPENTIN 300 MG: 300 CAPSULE ORAL at 08:46

## 2024-11-30 RX ADMIN — IPRATROPIUM BROMIDE AND ALBUTEROL SULFATE 1 DOSE: .5; 2.5 SOLUTION RESPIRATORY (INHALATION) at 18:16

## 2024-11-30 RX ADMIN — Medication 1 TABLET: at 08:45

## 2024-11-30 RX ADMIN — IPRATROPIUM BROMIDE AND ALBUTEROL SULFATE 1 DOSE: .5; 2.5 SOLUTION RESPIRATORY (INHALATION) at 06:28

## 2024-11-30 RX ADMIN — ATORVASTATIN CALCIUM 20 MG: 20 TABLET, FILM COATED ORAL at 20:09

## 2024-11-30 RX ADMIN — BUDESONIDE INHALATION 500 MCG: 0.5 SUSPENSION RESPIRATORY (INHALATION) at 06:28

## 2024-11-30 RX ADMIN — CYANOCOBALAMIN TAB 1000 MCG 2000 MCG: 1000 TAB at 08:45

## 2024-11-30 RX ADMIN — ARFORMOTEROL TARTRATE 15 MCG: 15 SOLUTION RESPIRATORY (INHALATION) at 18:16

## 2024-11-30 RX ADMIN — FERROUS SULFATE TAB 325 MG (65 MG ELEMENTAL FE) 325 MG: 325 (65 FE) TAB at 08:46

## 2024-11-30 RX ADMIN — SODIUM CHLORIDE, PRESERVATIVE FREE 10 ML: 5 INJECTION INTRAVENOUS at 20:09

## 2024-11-30 RX ADMIN — PANTOPRAZOLE SODIUM 40 MG: 40 INJECTION, POWDER, FOR SOLUTION INTRAVENOUS at 22:58

## 2024-11-30 RX ADMIN — SODIUM CHLORIDE, PRESERVATIVE FREE 10 ML: 5 INJECTION INTRAVENOUS at 08:50

## 2024-11-30 RX ADMIN — METOPROLOL SUCCINATE 25 MG: 25 TABLET, EXTENDED RELEASE ORAL at 08:46

## 2024-11-30 NOTE — PLAN OF CARE
Problem: Discharge Planning  Goal: Discharge to home or other facility with appropriate resources  11/30/2024 1648 by Michelle Sparks, RN  Outcome: Progressing  11/30/2024 0607 by Rachel Auguste RN  Outcome: Progressing  Flowsheets (Taken 11/29/2024 2000)  Discharge to home or other facility with appropriate resources:   Identify barriers to discharge with patient and caregiver   Arrange for needed discharge resources and transportation as appropriate   Identify discharge learning needs (meds, wound care, etc)   Refer to discharge planning if patient needs post-hospital services based on physician order or complex needs related to functional status, cognitive ability or social support system     Problem: ABCDS Injury Assessment  Goal: Absence of physical injury  11/30/2024 1648 by Michelle Sparks, RN  Outcome: Progressing  11/30/2024 0607 by Rachel Auguste RN  Outcome: Progressing     Problem: Safety - Adult  Goal: Free from fall injury  11/30/2024 1648 by Michelle Sparks, RN  Outcome: Progressing  11/30/2024 0607 by Rachel Auguste RN  Outcome: Progressing

## 2024-11-30 NOTE — PLAN OF CARE
Problem: Discharge Planning  Goal: Discharge to home or other facility with appropriate resources  Outcome: Progressing  Flowsheets (Taken 11/29/2024 2000)  Discharge to home or other facility with appropriate resources:   Identify barriers to discharge with patient and caregiver   Arrange for needed discharge resources and transportation as appropriate   Identify discharge learning needs (meds, wound care, etc)   Refer to discharge planning if patient needs post-hospital services based on physician order or complex needs related to functional status, cognitive ability or social support system     Problem: ABCDS Injury Assessment  Goal: Absence of physical injury  Outcome: Progressing     Problem: Safety - Adult  Goal: Free from fall injury  Outcome: Progressing

## 2024-11-30 NOTE — PROGRESS NOTES
Progress Note  Date:2024       Room:22/0622-02  Patient Name:Monica Epperson     YOB: 1950     Age:74 y.o.        Subjective    Subjective:  Symptoms:  Stable.  No shortness of breath, cough, chest pain, weakness or diarrhea.  (On O2 , she is on chronic o2 3-4 L , feels ok , tolerating diet well).    Diet:  Adequate intake.  No nausea.    Activity level: Activity impairment: Walks with a cane.       Review of Systems   Constitutional:  Negative for appetite change, chills, fatigue, fever and unexpected weight change.   HENT:  Negative for congestion, facial swelling, mouth sores, rhinorrhea and sinus pain.    Eyes:  Negative for visual disturbance.   Respiratory:  Negative for cough, chest tightness, shortness of breath and wheezing.    Cardiovascular:  Negative for chest pain and leg swelling.   Gastrointestinal:  Negative for abdominal distention, abdominal pain, blood in stool, constipation, diarrhea and nausea.   Genitourinary:  Negative for difficulty urinating, dysuria, frequency and urgency.   Musculoskeletal:  Negative for joint swelling.   Skin:  Negative for rash.   Neurological:  Positive for numbness. Negative for dizziness, syncope, weakness, light-headedness and headaches.        Right leg from knee down   Psychiatric/Behavioral:  Negative for behavioral problems, confusion and hallucinations.      Objective         Vitals Last 24 Hours:  TEMPERATURE:  Temp  Av.2 °F (36.8 °C)  Min: 97.9 °F (36.6 °C)  Max: 98.5 °F (36.9 °C)  RESPIRATIONS RANGE: Resp  Av.2  Min: 16  Max: 18  PULSE OXIMETRY RANGE: SpO2  Av.8 %  Min: 99 %  Max: 100 %  PULSE RANGE: Pulse  Av.8  Min: 56  Max: 80  BLOOD PRESSURE RANGE: Systolic (24hrs), Av , Min:118 , Max:146   ; Diastolic (24hrs), Av, Min:64, Max:84    I/O (24Hr):    Intake/Output Summary (Last 24 hours) at 2024 1323  Last data filed at 2024 0850  Gross per 24 hour   Intake 490 ml   Output --   Net 490 ml

## 2024-11-30 NOTE — PROGRESS NOTES
PROGRESS NOTE  By Casey Thornton, MUSA    [facility]  Dr. Cindy Whitten M.D.,  Dr. Bobby Serna M.D.,   Dr. Ashutosh Carmichael D.O.,  Dr. Andre Marinelli M.D.,  Dr. Spike Cruz D.O.,          Monica PARKER Zhou  74 y.o.  female    SUBJECTIVE:  Patient sitting up on edge of bed.  Tolerating breakfast.  Denies abdominal pain.  Denies nausea or vomiting.    OBJECTIVE:    /65   Pulse 67   Temp 98 °F (36.7 °C) (Oral)   Resp 18   Ht 1.6 m (5' 3\")   Wt 63.5 kg (140 lb)   SpO2 100%   BMI 24.80 kg/m²     General: NAD/adult  female.  AAOx3  HEENT: Anicteric sclera/moist oral mucosa  Neck: Supple with trachea midline  Chest: CTAB/symmetric excursions  Cor: Regular/S1-S2  Abd.: Soft/nontender and nondistended  Extr.:  No significant peripheral edema.  No cyanosis or clubbing  Skin: Warm and dry/anicteric      DATA:    Monitor data reviewed -sinus rhythm noted.       Lab Results   Component Value Date/Time    WBC 5.5 11/29/2024 05:39 AM    RBC 2.63 11/29/2024 05:39 AM    HGB 9.1 11/29/2024 06:20 PM    HCT 27.5 11/29/2024 06:20 PM    MCV 96.6 11/29/2024 05:39 AM    MCH 30.8 11/29/2024 05:39 AM    MCHC 31.9 11/29/2024 05:39 AM    RDW 18.8 11/29/2024 05:39 AM     11/29/2024 05:39 AM    MPV 9.9 11/29/2024 05:39 AM     Lab Results   Component Value Date/Time     11/29/2024 05:39 AM    K 3.6 11/29/2024 05:39 AM     11/29/2024 05:39 AM    CO2 26 11/29/2024 05:39 AM    BUN 14 11/29/2024 05:39 AM    CREATININE 1.5 11/29/2024 05:39 AM    CALCIUM 8.8 11/29/2024 05:39 AM    BILITOT 0.4 11/29/2024 05:39 AM    ALKPHOS 62 11/29/2024 05:39 AM    AST 21 11/29/2024 05:39 AM    ALT 15 11/29/2024 05:39 AM     No results found for: \"LIPASE\"  No results found for: \"AMYLASE\"      ASSESSMENT/PLAN:  Patient Active Problem List   Diagnosis    Chest pain    Atypical chest pain    Essential hypertension    Mixed hyperlipidemia    Chronic obstructive pulmonary disease (HCC)    Subcutaneous emphysema

## 2024-11-30 NOTE — PROGRESS NOTES
Spiritual Health History and Assessment/Progress Note  [facility]    (P) Spiritual/Emotional Needs,  ,  ,      Name: Monica Epperson MRN: [id number]    Age: 74 y.o.     Sex: female   Language: English   [employer]aritan: [employer]iness   GI bleed     Date: 11/30/2024                           Spiritual Assessment began in [facility]        Referral/Consult From: (P) Rounding   Encounter Overview/Reason: (P) Spiritual/Emotional Needs  Service Provided For: (P) Patient    Re, Belief, Meaning:   Patient identifies as spiritual  Family/Friends No family/friends present      Importance and Influence:  Patient has spiritual/personal beliefs that influence decisions regarding their health  Family/Friends No family/friends present    Community:  Patient feels well-supported. Support system includes: Children  Family/Friends No family/friends present    Assessment and Plan of Care:     Patient Interventions include: Facilitated expression of thoughts and feelings, Explored spiritual coping/struggle/distress, and Affirmed coping skills/support systems  Family/Friends Interventions include: No family/friends present    Patient Plan of Care: Spiritual Care available upon further referral  Family/Friends Plan of Care: Spiritual Care available upon further referral    Electronically signed by RON Le on 11/30/2024 at 10:56 AM

## 2024-11-30 NOTE — CONSULTS
[facility] Nephrology Consult       Patient's Name:  Monica Epperson  Date of Service:  11/30/2024  Requesting    Ranjan Carranza MD    Reason for Consult:            MAGDALENA     Chief Complaint:                  Numb rt Lower extremity     Information obtained from: Patient , chart     History of Present Illness: 74 yrs old WF , ex smoker     Known h/o PVD , s/p multiple stents placed rt lower extremity , follows with Dr Hui, COPD prior GI bleed HTN , HLD , uterine CA     Came in 5 days ago with numb rt lower extremity , evaluated by vascular team - for follow up as out pt , also noted anemic - suspect GIB , upper GI scope -- no active bleeding , colonoscopy - diverticular disease .    S creatinine was 2.1 on arrival , improved to 1.5 ( Creatinine 0.8 in 2018) , she remains non oliguric , renal ultrasound - thin cortex , mild alysia atrophy - medical renal disease , no obstruction     Patient feels well at present time       Past Medical History:   Diagnosis Date    Anemia     Blood transfusion 2004    16 units plus BLEEDING ULCER    Cancer (HCC) 1971    uterus    Chronic obstructive pulmonary disease (HCC)     Hx of blood clots     LEG ON PLAVIX    Hyperlipidemia     Hypertension     PVD (peripheral vascular disease) (HCC)          Past Surgical History:   Procedure Laterality Date    ABDOMEN SURGERY      repair bleeding ulcer    APPENDECTOMY      CARDIAC CATHETERIZATION  07/18/2018    Dr. Vasquez    COLON SURGERY  2005    6 inches colon removed    COLONOSCOPY      ENDOSCOPY, COLON, DIAGNOSTIC      HYSTERECTOMY (CERVIX STATUS UNKNOWN)      OTHER SURGICAL HISTORY  2004    blood clot removed from rt. leg x 2     SPINE SURGERY N/A 11/13/2020    KYPHOPLASTY T7, T8 performed by Philip Shaw DO at [facility] OR    TONGUE BIOPSY      x 2    TRANSLUMINAL ANGIOPLASTY Right 4/15/15    stent Rt  distal SFA    VASCULAR SURGERY  2009    stent rt. leg    VASCULAR SURGERY  2014    stent rt leg         Social History

## 2024-12-01 VITALS
HEIGHT: 63 IN | HEART RATE: 79 BPM | DIASTOLIC BLOOD PRESSURE: 86 MMHG | BODY MASS INDEX: 24.8 KG/M2 | TEMPERATURE: 97.7 F | OXYGEN SATURATION: 92 % | WEIGHT: 140 LBS | RESPIRATION RATE: 18 BRPM | SYSTOLIC BLOOD PRESSURE: 152 MMHG

## 2024-12-01 LAB
ALBUMIN SERPL-MCNC: 3.3 G/DL (ref 3.5–5.2)
ALP SERPL-CCNC: 58 U/L (ref 35–104)
ALT SERPL-CCNC: 14 U/L (ref 0–32)
ANION GAP SERPL CALCULATED.3IONS-SCNC: 7 MMOL/L (ref 7–16)
AST SERPL-CCNC: 18 U/L (ref 0–31)
BASOPHILS # BLD: 0.03 K/UL (ref 0–0.2)
BASOPHILS NFR BLD: 1 % (ref 0–2)
BILIRUB SERPL-MCNC: 0.2 MG/DL (ref 0–1.2)
BUN SERPL-MCNC: 10 MG/DL (ref 6–23)
CALCIUM SERPL-MCNC: 8.5 MG/DL (ref 8.6–10.2)
CHLORIDE SERPL-SCNC: 109 MMOL/L (ref 98–107)
CO2 SERPL-SCNC: 27 MMOL/L (ref 22–29)
CREAT SERPL-MCNC: 1.4 MG/DL (ref 0.5–1)
EOSINOPHIL # BLD: 0.2 K/UL (ref 0.05–0.5)
EOSINOPHILS RELATIVE PERCENT: 4 % (ref 0–6)
ERYTHROCYTE [DISTWIDTH] IN BLOOD BY AUTOMATED COUNT: 17.4 % (ref 11.5–15)
GFR, ESTIMATED: 38 ML/MIN/1.73M2
GLUCOSE SERPL-MCNC: 76 MG/DL (ref 74–99)
HCT VFR BLD AUTO: 25.6 % (ref 34–48)
HCT VFR BLD AUTO: 29.5 % (ref 34–48)
HGB BLD-MCNC: 8.1 G/DL (ref 11.5–15.5)
HGB BLD-MCNC: 9 G/DL (ref 11.5–15.5)
IMM GRANULOCYTES # BLD AUTO: <0.03 K/UL (ref 0–0.58)
IMM GRANULOCYTES NFR BLD: 0 % (ref 0–5)
LYMPHOCYTES NFR BLD: 1.23 K/UL (ref 1.5–4)
LYMPHOCYTES RELATIVE PERCENT: 23 % (ref 20–42)
MCH RBC QN AUTO: 31.5 PG (ref 26–35)
MCHC RBC AUTO-ENTMCNC: 31.6 G/DL (ref 32–34.5)
MCV RBC AUTO: 99.6 FL (ref 80–99.9)
MONOCYTES NFR BLD: 0.59 K/UL (ref 0.1–0.95)
MONOCYTES NFR BLD: 11 % (ref 2–12)
NEUTROPHILS NFR BLD: 61 % (ref 43–80)
NEUTS SEG NFR BLD: 3.23 K/UL (ref 1.8–7.3)
PLATELET # BLD AUTO: 322 K/UL (ref 130–450)
PMV BLD AUTO: 10.3 FL (ref 7–12)
POTASSIUM SERPL-SCNC: 4 MMOL/L (ref 3.5–5)
PROT SERPL-MCNC: 5.2 G/DL (ref 6.4–8.3)
RBC # BLD AUTO: 2.57 M/UL (ref 3.5–5.5)
SODIUM SERPL-SCNC: 143 MMOL/L (ref 132–146)
WBC OTHER # BLD: 5.3 K/UL (ref 4.5–11.5)

## 2024-12-01 PROCEDURE — 2580000003 HC RX 258: Performed by: HOSPITALIST

## 2024-12-01 PROCEDURE — 85018 HEMOGLOBIN: CPT

## 2024-12-01 PROCEDURE — 6360000002 HC RX W HCPCS: Performed by: HOSPITALIST

## 2024-12-01 PROCEDURE — 85025 COMPLETE CBC W/AUTO DIFF WBC: CPT

## 2024-12-01 PROCEDURE — 85014 HEMATOCRIT: CPT

## 2024-12-01 PROCEDURE — 6370000000 HC RX 637 (ALT 250 FOR IP): Performed by: FAMILY MEDICINE

## 2024-12-01 PROCEDURE — 36415 COLL VENOUS BLD VENIPUNCTURE: CPT

## 2024-12-01 PROCEDURE — 80053 COMPREHEN METABOLIC PANEL: CPT

## 2024-12-01 PROCEDURE — 2700000000 HC OXYGEN THERAPY PER DAY

## 2024-12-01 PROCEDURE — 2580000003 HC RX 258: Performed by: FAMILY MEDICINE

## 2024-12-01 PROCEDURE — 6360000002 HC RX W HCPCS: Performed by: INTERNAL MEDICINE

## 2024-12-01 PROCEDURE — 6370000000 HC RX 637 (ALT 250 FOR IP): Performed by: INTERNAL MEDICINE

## 2024-12-01 PROCEDURE — 6360000002 HC RX W HCPCS: Performed by: FAMILY MEDICINE

## 2024-12-01 PROCEDURE — 99239 HOSP IP/OBS DSCHRG MGMT >30: CPT | Performed by: INTERNAL MEDICINE

## 2024-12-01 PROCEDURE — 94640 AIRWAY INHALATION TREATMENT: CPT

## 2024-12-01 RX ADMIN — GABAPENTIN 300 MG: 300 CAPSULE ORAL at 08:38

## 2024-12-01 RX ADMIN — ENOXAPARIN SODIUM 40 MG: 100 INJECTION SUBCUTANEOUS at 08:37

## 2024-12-01 RX ADMIN — METOPROLOL SUCCINATE 25 MG: 25 TABLET, EXTENDED RELEASE ORAL at 08:38

## 2024-12-01 RX ADMIN — CYANOCOBALAMIN TAB 1000 MCG 2000 MCG: 1000 TAB at 08:37

## 2024-12-01 RX ADMIN — ARFORMOTEROL TARTRATE 15 MCG: 15 SOLUTION RESPIRATORY (INHALATION) at 06:05

## 2024-12-01 RX ADMIN — Medication 1000 UNITS: at 08:37

## 2024-12-01 RX ADMIN — PYRIDOXINE HCL TAB 50 MG 100 MG: 50 TAB at 08:38

## 2024-12-01 RX ADMIN — IPRATROPIUM BROMIDE AND ALBUTEROL SULFATE 1 DOSE: .5; 2.5 SOLUTION RESPIRATORY (INHALATION) at 06:04

## 2024-12-01 RX ADMIN — Medication 1 TABLET: at 08:38

## 2024-12-01 RX ADMIN — BUDESONIDE INHALATION 500 MCG: 0.5 SUSPENSION RESPIRATORY (INHALATION) at 06:04

## 2024-12-01 RX ADMIN — PANTOPRAZOLE SODIUM 40 MG: 40 INJECTION, POWDER, FOR SOLUTION INTRAVENOUS at 11:59

## 2024-12-01 RX ADMIN — OXYCODONE HYDROCHLORIDE AND ACETAMINOPHEN 1000 MG: 500 TABLET ORAL at 08:38

## 2024-12-01 RX ADMIN — LISINOPRIL 40 MG: 20 TABLET ORAL at 08:38

## 2024-12-01 RX ADMIN — SODIUM CHLORIDE, PRESERVATIVE FREE 10 ML: 5 INJECTION INTRAVENOUS at 08:38

## 2024-12-01 RX ADMIN — CLOPIDOGREL BISULFATE 75 MG: 75 TABLET ORAL at 09:32

## 2024-12-01 RX ADMIN — Medication 400 UNITS: at 08:38

## 2024-12-01 NOTE — PROGRESS NOTES
Progress Note  12/1/2024 10:20 AM  Subjective:   Admit Date: 11/25/2024  PCP: Ranjan Carranza MD  Interval History: patient examined , doing well feels ok     Diet: ADULT DIET; Regular    Data:   Scheduled Meds:   enoxaparin  40 mg SubCUTAneous Daily    sodium chloride flush  5-40 mL IntraVENous 2 times per day    arformoterol tartrate  15 mcg Nebulization BID RT    budesonide  0.5 mg Nebulization BID RT    vitamin C  1,000 mg Oral Daily    [Held by provider] aspirin  81 mg Oral Daily    oyster shell calcium w/D  1 tablet Oral Daily    clopidogrel  75 mg Oral Daily    vitamin B-12  2,000 mcg Oral BID    ferrous sulfate  325 mg Oral Daily with breakfast    gabapentin  300 mg Oral BID    lisinopril  40 mg Oral Daily    atorvastatin  20 mg Oral Nightly    metoprolol succinate  25 mg Oral Daily    pyridoxine  100 mg Oral Daily    Vitamin D  1,000 Units Oral Daily    vitamin E  400 Units Oral Daily    pantoprazole (PROTONIX) 40 mg in sodium chloride (PF) 0.9 % 10 mL injection  40 mg IntraVENous Q12H     Continuous Infusions:   sodium chloride      sodium chloride      sodium chloride       PRN Meds:sodium chloride, sodium chloride flush, acetaminophen **OR** acetaminophen, ipratropium 0.5 mg-albuterol 2.5 mg, sodium chloride, sodium chloride  I/O last 3 completed shifts:  In: 550 [P.O.:540; I.V.:10]  Out: -   No intake/output data recorded.    Intake/Output Summary (Last 24 hours) at 12/1/2024 1020  Last data filed at 11/30/2024 1214  Gross per 24 hour   Intake 360 ml   Output --   Net 360 ml     CBC:   Recent Labs     11/29/24  0539 11/29/24  1820 11/30/24  0626 12/01/24  0432   WBC 5.5  --  4.8 5.3   HGB 8.1* 9.1* 8.0* 8.1*     --  318 322     BMP:    Recent Labs     11/29/24  0539 11/30/24  0626 12/01/24  0432    141 143   K 3.6 3.9 4.0    109* 109*   CO2 26 26 27   BUN 14 12 10   CREATININE 1.5* 1.5* 1.4*   GLUCOSE 84 82 76     Hepatic:   Recent Labs     11/29/24  0539 11/30/24  0626  stable at 8.1      3) H/O PVD multiple stents , interventions rt lower ext , vascular surgery comments noted       Came in with numb lower ext - somewhat better now , clinically wasr and perfused extremity      4) HTN --BP controlled -- on lisinopril 40 daily - continue      5) HLD on statins         Renal ultrasound results noted  , continue present care , will follow         Thank you for allowing us to participate in the care of Monica Marsh MD

## 2024-12-01 NOTE — PLAN OF CARE
Problem: Discharge Planning  Goal: Discharge to home or other facility with appropriate resources  12/1/2024 1001 by Ranjan Joel RN  Outcome: Progressing  Flowsheets (Taken 11/29/2024 2000 by Rachel Auguste, RN)  Discharge to home or other facility with appropriate resources:   Identify barriers to discharge with patient and caregiver   Arrange for needed discharge resources and transportation as appropriate   Identify discharge learning needs (meds, wound care, etc)   Refer to discharge planning if patient needs post-hospital services based on physician order or complex needs related to functional status, cognitive ability or social support system     Problem: ABCDS Injury Assessment  Goal: Absence of physical injury  12/1/2024 1001 by Ranjan Joel, RN  Outcome: Progressing  Flowsheets (Taken 11/27/2024 1011)  Absence of Physical Injury: Implement safety measures based on patient assessment     Problem: Safety - Adult  Goal: Free from fall injury  12/1/2024 1001 by Ranjan Joel, RN  Outcome: Progressing  Flowsheets (Taken 11/27/2024 1011)  Free From Fall Injury: Instruct family/caregiver on patient safety

## 2024-12-01 NOTE — PLAN OF CARE
Problem: Discharge Planning  Goal: Discharge to home or other facility with appropriate resources  12/1/2024 0020 by Dixie Garza RN  Outcome: Progressing  11/30/2024 1648 by Michelle Sparks RN  Outcome: Progressing     Problem: ABCDS Injury Assessment  Goal: Absence of physical injury  12/1/2024 0020 by Dixie Garza RN  Outcome: Progressing  11/30/2024 1648 by Michelle Sparks, RN  Outcome: Progressing     Problem: Safety - Adult  Goal: Free from fall injury  12/1/2024 0020 by Dixie Garza, RN  Outcome: Progressing  11/30/2024 1648 by Michelle Sparks, RN  Outcome: Progressing

## 2024-12-02 ENCOUNTER — TELEPHONE (OUTPATIENT)
Dept: VASCULAR SURGERY | Age: 74
End: 2024-12-02

## 2024-12-05 LAB — SURGICAL PATHOLOGY REPORT: NORMAL

## 2024-12-09 ENCOUNTER — OFFICE VISIT (OUTPATIENT)
Dept: CARDIOLOGY CLINIC | Age: 74
End: 2024-12-09
Payer: MEDICARE

## 2024-12-09 VITALS
BODY MASS INDEX: 25.69 KG/M2 | RESPIRATION RATE: 16 BRPM | WEIGHT: 145 LBS | HEIGHT: 63 IN | DIASTOLIC BLOOD PRESSURE: 84 MMHG | SYSTOLIC BLOOD PRESSURE: 162 MMHG | HEART RATE: 77 BPM

## 2024-12-09 DIAGNOSIS — I10 ESSENTIAL HYPERTENSION: Primary | ICD-10-CM

## 2024-12-09 DIAGNOSIS — R06.02 SHORTNESS OF BREATH: ICD-10-CM

## 2024-12-09 PROCEDURE — G8427 DOCREV CUR MEDS BY ELIG CLIN: HCPCS | Performed by: INTERNAL MEDICINE

## 2024-12-09 PROCEDURE — G8484 FLU IMMUNIZE NO ADMIN: HCPCS | Performed by: INTERNAL MEDICINE

## 2024-12-09 PROCEDURE — 1090F PRES/ABSN URINE INCON ASSESS: CPT | Performed by: INTERNAL MEDICINE

## 2024-12-09 PROCEDURE — 93000 ELECTROCARDIOGRAM COMPLETE: CPT | Performed by: INTERNAL MEDICINE

## 2024-12-09 PROCEDURE — 3077F SYST BP >= 140 MM HG: CPT | Performed by: INTERNAL MEDICINE

## 2024-12-09 PROCEDURE — 99204 OFFICE O/P NEW MOD 45 MIN: CPT | Performed by: INTERNAL MEDICINE

## 2024-12-09 PROCEDURE — 3079F DIAST BP 80-89 MM HG: CPT | Performed by: INTERNAL MEDICINE

## 2024-12-09 PROCEDURE — G8419 CALC BMI OUT NRM PARAM NOF/U: HCPCS | Performed by: INTERNAL MEDICINE

## 2024-12-09 RX ORDER — FUROSEMIDE 20 MG/1
20 TABLET ORAL DAILY
COMMUNITY

## 2024-12-09 RX ORDER — ALENDRONATE SODIUM 70 MG/1
70 TABLET ORAL
COMMUNITY

## 2024-12-09 RX ORDER — REGADENOSON 0.08 MG/ML
0.4 INJECTION, SOLUTION INTRAVENOUS
OUTPATIENT
Start: 2024-12-09

## 2024-12-09 RX ORDER — ASPIRIN 81 MG/1
81 TABLET ORAL DAILY
COMMUNITY

## 2024-12-09 NOTE — PROGRESS NOTES
bradycardia, normal axis    ECHO:           7/18/2018,   Left ventricular size is normal.   Normal LV segmental wall motion, EF 56%.   There is doppler evidence of stage I diastolic dysfunction.   Left atrium is of normal size.   Interatrial septum not well visualized but appears intact.   Normal right ventricle structure and function.   Normal mitral valve structure and function.   No mitral valve prolapse.   Normal aortic valve structure and function.   There is trace to mild tricuspid regurgitation, RVSP 23mmHg.   Normal aortic root and ascending aorta.   No evidence of pericardial effusion.   No intracardiac mass.   No recent study to compare      Stress Test: 7/19/2018,  1. Pharmacologically-induced perfusion defect involving the mid  anterior wall, without evidence of reversibility.  2. Diffusely hypokinetic left ventricular wall motion with a focal  area of pronounced hypokinesia involving the mid anterior wall.  3. Left ventricular ejection fraction of 45 %    Angiography: 7/18/2018,  Normal coronary arteries  Low normal LVEF  False positve stress test      Cardiology Labs: BMP:    Lab Results   Component Value Date/Time     12/01/2024 04:32 AM    K 4.0 12/01/2024 04:32 AM     12/01/2024 04:32 AM    CO2 27 12/01/2024 04:32 AM    BUN 10 12/01/2024 04:32 AM    CREATININE 1.4 12/01/2024 04:32 AM     CMP:    Lab Results   Component Value Date/Time     12/01/2024 04:32 AM    K 4.0 12/01/2024 04:32 AM     12/01/2024 04:32 AM    CO2 27 12/01/2024 04:32 AM    BUN 10 12/01/2024 04:32 AM    CREATININE 1.4 12/01/2024 04:32 AM     CBC:    Lab Results   Component Value Date/Time    WBC 5.3 12/01/2024 04:32 AM    RBC 2.57 12/01/2024 04:32 AM    HGB 9.0 12/01/2024 11:54 AM    HCT 29.5 12/01/2024 11:54 AM    MCV 99.6 12/01/2024 04:32 AM    RDW 17.4 12/01/2024 04:32 AM     12/01/2024 04:32 AM     PT/INR:  No results found for: \"PTINR\"  PT/INR Warfarin:  No components found for: \"PTPATWAR\",

## 2024-12-12 NOTE — DISCHARGE SUMMARY
CONTINUE these medications which have NOT CHANGED    Details   fluticasone-umeclidin-vilant (TRELEGY ELLIPTA) 100-62.5-25 MCG/INH AEPB Inhale 1 puff into the lungs Daily with lunch BRINGHistorical Med      metoprolol succinate (TOPROL XL) 25 MG extended release tablet Take 1 tablet by mouth daily, Disp-30 tablet, R-3Normal      albuterol sulfate  (90 Base) MCG/ACT inhaler Inhale 2 puffs into the lungs every 6 hours as needed for WheezingHistorical Med      calcium carbonate-vitamin D (CALTRATE) 600-400 MG-UNIT TABS per tab Take 1 tablet by mouth dailyHistorical Med      loratadine (CLARITIN) 10 MG tablet Take 10 mg by mouth daily as needed Historical Med      gabapentin (NEURONTIN) 300 MG capsule Take 300 mg by mouth 2 times daily. .Historical Med      vitamin E 400 UNIT capsule Take 400 Units by mouth daily.Historical Med      pyridoxine (B-6) 100 MG tablet Take 100 mg by mouth daily.      Ascorbic Acid (VITAMIN C) 250 MG tablet Take 1,000 mg by mouth daily.      clopidogrel (PLAVIX) 75 MG tablet Take 75 mg by mouth daily.        lisinopril (PRINIVIL;ZESTRIL) 40 MG tablet Take 40 mg by mouth daily.Historical Med      lovastatin (MEVACOR) 40 MG tablet Take 40 mg by mouth nightly.        ferrous sulfate 325 (65 FE) MG tablet Take 325 mg by mouth daily (with breakfast) Historical Med      vitamin D (CHOLECALCIFEROL) 1000 UNIT TABS tablet Take 1,000 Units by mouth daily.        Cyanocobalamin (VITAMIN B 12) 250 MCG LOZG Take 2,500 mcg by mouth 2 times daily.           STOP taking these medications       BABY ASPIRIN PO Comments:   Reason for Stopping:                 Note that more than 30 minutes was spent in preparing discharge papers, discussing discharge with patient, medication review, etc.    NOTE: This report was transcribed using voice recognition software. Every effort was made to ensure accuracy; however, inadvertent computerized transcription errors may be present.     Signed:  Electronically

## 2025-01-02 ENCOUNTER — TELEPHONE (OUTPATIENT)
Dept: CARDIOLOGY | Age: 75
End: 2025-01-02

## 2025-01-02 NOTE — TELEPHONE ENCOUNTER
Left message on voice mail to remind patient of lexiscan stress test appointment on January 7, 2025 at 1030. Instructions for test,brinhg inhaler, and COVID-19 preprocedure information left on voice mail. Asked patient to call with any questions or if unable to keep appointment.

## 2025-01-07 ENCOUNTER — HOSPITAL ENCOUNTER (OUTPATIENT)
Dept: CARDIOLOGY | Age: 75
Discharge: HOME | End: 2025-01-09
Payer: MEDICARE

## 2025-01-07 VITALS
HEART RATE: 68 BPM | DIASTOLIC BLOOD PRESSURE: 86 MMHG | BODY MASS INDEX: 25.69 KG/M2 | SYSTOLIC BLOOD PRESSURE: 166 MMHG | RESPIRATION RATE: 20 BRPM | WEIGHT: 145 LBS | HEIGHT: 63 IN

## 2025-01-07 DIAGNOSIS — R06.02 SHORTNESS OF BREATH: ICD-10-CM

## 2025-01-07 LAB
ECHO BSA: 1.71 M2
NUC STRESS EJECTION FRACTION: 53 %
STRESS BASELINE DIAS BP: 86 MMHG
STRESS BASELINE HR: 66 BPM
STRESS BASELINE SYS BP: 166 MMHG
STRESS ESTIMATED WORKLOAD: 1 METS
STRESS PEAK DIAS BP: 90 MMHG
STRESS PEAK SYS BP: 170 MMHG
STRESS PERCENT HR ACHIEVED: 63 %
STRESS POST PEAK HR: 90 BPM
STRESS RATE PRESSURE PRODUCT: NORMAL BPM*MMHG
STRESS TARGET HR: 144 BPM
TID: 1.05

## 2025-01-07 PROCEDURE — 2500000003 HC RX 250 WO HCPCS: Performed by: INTERNAL MEDICINE

## 2025-01-07 PROCEDURE — A9502 TC99M TETROFOSMIN: HCPCS | Performed by: INTERNAL MEDICINE

## 2025-01-07 PROCEDURE — 3430000000 HC RX DIAGNOSTIC RADIOPHARMACEUTICAL: Performed by: INTERNAL MEDICINE

## 2025-01-07 PROCEDURE — 93016 CV STRESS TEST SUPVJ ONLY: CPT | Performed by: INTERNAL MEDICINE

## 2025-01-07 PROCEDURE — 6360000002 HC RX W HCPCS: Performed by: INTERNAL MEDICINE

## 2025-01-07 PROCEDURE — 93018 CV STRESS TEST I&R ONLY: CPT | Performed by: INTERNAL MEDICINE

## 2025-01-07 PROCEDURE — 93017 CV STRESS TEST TRACING ONLY: CPT

## 2025-01-07 PROCEDURE — 78452 HT MUSCLE IMAGE SPECT MULT: CPT | Performed by: INTERNAL MEDICINE

## 2025-01-07 RX ORDER — SODIUM CHLORIDE 0.9 % (FLUSH) 0.9 %
10 SYRINGE (ML) INJECTION PRN
Status: DISCONTINUED | OUTPATIENT
Start: 2025-01-07 | End: 2025-01-10 | Stop reason: HOSPADM

## 2025-01-07 RX ORDER — REGADENOSON 0.08 MG/ML
0.4 INJECTION, SOLUTION INTRAVENOUS
Status: COMPLETED | OUTPATIENT
Start: 2025-01-07 | End: 2025-01-07

## 2025-01-07 RX ORDER — FLUTICASONE PROPIONATE 50 MCG
SPRAY, SUSPENSION (ML) NASAL
COMMUNITY
Start: 2024-12-29

## 2025-01-07 RX ADMIN — SODIUM CHLORIDE, PRESERVATIVE FREE 10 ML: 5 INJECTION INTRAVENOUS at 11:37

## 2025-01-07 RX ADMIN — REGADENOSON 0.4 MG: 0.08 INJECTION, SOLUTION INTRAVENOUS at 11:36

## 2025-01-07 RX ADMIN — SODIUM CHLORIDE, PRESERVATIVE FREE 10 ML: 5 INJECTION INTRAVENOUS at 10:32

## 2025-01-07 RX ADMIN — TETROFOSMIN 8.2 MILLICURIE: 0.23 INJECTION, POWDER, LYOPHILIZED, FOR SOLUTION INTRAVENOUS at 10:31

## 2025-01-07 RX ADMIN — TETROFOSMIN 28.9 MILLICURIE: 0.23 INJECTION, POWDER, LYOPHILIZED, FOR SOLUTION INTRAVENOUS at 11:36

## 2025-01-09 ENCOUNTER — TELEPHONE (OUTPATIENT)
Dept: CARDIOLOGY CLINIC | Age: 75
End: 2025-01-09

## 2025-01-13 ENCOUNTER — TELEPHONE (OUTPATIENT)
Dept: CARDIOLOGY CLINIC | Age: 75
End: 2025-01-13

## 2025-01-13 NOTE — TELEPHONE ENCOUNTER
Ramiro Marroquin MD McGee, Shelia, MA Hi Shelia:  Would you please let her know that her stress test was okay.    Called patient left voicemail with results

## 2025-05-19 ENCOUNTER — TRANSCRIBE ORDERS (OUTPATIENT)
Dept: ADMINISTRATIVE | Age: 75
End: 2025-05-19

## 2025-05-19 DIAGNOSIS — C90.00 MULTIPLE MYELOMA NOT HAVING ACHIEVED REMISSION (HCC): ICD-10-CM

## 2025-05-19 DIAGNOSIS — N18.31 CHRONIC KIDNEY DISEASE (CKD) STAGE G3A/A1, MODERATELY DECREASED GLOMERULAR FILTRATION RATE (GFR) BETWEEN 45-59 ML/MIN/1.73 SQUARE METER AND ALBUMINURIA CREATININE RATIO LESS THAN 30 MG/G (HCC): ICD-10-CM

## 2025-05-19 DIAGNOSIS — E83.52 HYPERCALCEMIA: Primary | ICD-10-CM

## 2025-05-19 DIAGNOSIS — D47.2 MONOCLONAL GAMMOPATHY: ICD-10-CM

## 2025-05-19 DIAGNOSIS — D53.9 NUTRITIONAL ANEMIA: ICD-10-CM

## 2025-06-27 ENCOUNTER — HOSPITAL ENCOUNTER (OUTPATIENT)
Dept: NUCLEAR MEDICINE | Age: 75
Discharge: HOME | End: 2025-06-27
Attending: INTERNAL MEDICINE
Payer: MEDICARE

## 2025-06-27 DIAGNOSIS — E83.52 HYPERCALCEMIA: ICD-10-CM

## 2025-06-27 DIAGNOSIS — N18.31 CHRONIC KIDNEY DISEASE (CKD) STAGE G3A/A1, MODERATELY DECREASED GLOMERULAR FILTRATION RATE (GFR) BETWEEN 45-59 ML/MIN/1.73 SQUARE METER AND ALBUMINURIA CREATININE RATIO LESS THAN 30 MG/G (HCC): ICD-10-CM

## 2025-06-27 DIAGNOSIS — D47.2 MONOCLONAL GAMMOPATHY: ICD-10-CM

## 2025-06-27 DIAGNOSIS — D53.9 NUTRITIONAL ANEMIA: ICD-10-CM

## 2025-06-27 DIAGNOSIS — C90.00 MULTIPLE MYELOMA NOT HAVING ACHIEVED REMISSION (HCC): ICD-10-CM

## 2025-06-27 PROCEDURE — A9609 HC RX DIAGNOSTIC RADIOPHARMACEUTICAL: HCPCS | Performed by: RADIOLOGY

## 2025-06-27 PROCEDURE — 3430000000 HC RX DIAGNOSTIC RADIOPHARMACEUTICAL: Performed by: RADIOLOGY

## 2025-06-27 RX ORDER — FLUDEOXYGLUCOSE F 18 200 MCI/ML
15 INJECTION, SOLUTION INTRAVENOUS
Status: COMPLETED | OUTPATIENT
Start: 2025-06-27 | End: 2025-06-27

## 2025-06-27 RX ADMIN — FLUDEOXYGLUCOSE F 18 15 MILLICURIE: 200 INJECTION, SOLUTION INTRAVENOUS at 18:02

## 2025-07-16 ENCOUNTER — OFFICE VISIT (OUTPATIENT)
Dept: CARDIOLOGY CLINIC | Age: 75
End: 2025-07-16
Payer: MEDICARE

## 2025-07-16 VITALS
BODY MASS INDEX: 25.16 KG/M2 | DIASTOLIC BLOOD PRESSURE: 84 MMHG | WEIGHT: 142 LBS | HEIGHT: 63 IN | HEART RATE: 79 BPM | RESPIRATION RATE: 16 BRPM | SYSTOLIC BLOOD PRESSURE: 124 MMHG

## 2025-07-16 DIAGNOSIS — R06.02 SHORTNESS OF BREATH: Primary | ICD-10-CM

## 2025-07-16 DIAGNOSIS — I10 ESSENTIAL HYPERTENSION: ICD-10-CM

## 2025-07-16 PROCEDURE — 1160F RVW MEDS BY RX/DR IN RCRD: CPT | Performed by: INTERNAL MEDICINE

## 2025-07-16 PROCEDURE — 3074F SYST BP LT 130 MM HG: CPT | Performed by: INTERNAL MEDICINE

## 2025-07-16 PROCEDURE — G8399 PT W/DXA RESULTS DOCUMENT: HCPCS | Performed by: INTERNAL MEDICINE

## 2025-07-16 PROCEDURE — 1123F ACP DISCUSS/DSCN MKR DOCD: CPT | Performed by: INTERNAL MEDICINE

## 2025-07-16 PROCEDURE — G8419 CALC BMI OUT NRM PARAM NOF/U: HCPCS | Performed by: INTERNAL MEDICINE

## 2025-07-16 PROCEDURE — 1159F MED LIST DOCD IN RCRD: CPT | Performed by: INTERNAL MEDICINE

## 2025-07-16 PROCEDURE — 93000 ELECTROCARDIOGRAM COMPLETE: CPT | Performed by: INTERNAL MEDICINE

## 2025-07-16 PROCEDURE — 1090F PRES/ABSN URINE INCON ASSESS: CPT | Performed by: INTERNAL MEDICINE

## 2025-07-16 PROCEDURE — 1036F TOBACCO NON-USER: CPT | Performed by: INTERNAL MEDICINE

## 2025-07-16 PROCEDURE — G8427 DOCREV CUR MEDS BY ELIG CLIN: HCPCS | Performed by: INTERNAL MEDICINE

## 2025-07-16 PROCEDURE — 3079F DIAST BP 80-89 MM HG: CPT | Performed by: INTERNAL MEDICINE

## 2025-07-16 PROCEDURE — 99213 OFFICE O/P EST LOW 20 MIN: CPT | Performed by: INTERNAL MEDICINE

## 2025-07-16 NOTE — PROGRESS NOTES
[facility] consult  Dr. Ramiro Marroquin      Reason for Consult: Dyspnea on exertion, pulmonary hypertension  Referring Physician: Ranjan Carranza MD     CHIEF COMPLAINT:   Chief Complaint   Patient presents with    Heart Problem     6 month        HISTORY OF PRESENT ILLNESS:   Patient is 75 years old female with history of hypertension, hyperlipidemia, peripheral vascular disease, COPD and moderate pulmonary hypertension is here for evaluation of pulmonary hypertension and shortness of breath  Shortness of breath is at baseline, no chest pain, occasional palpitations, occasional pedal edema responds to Lasix, no PND, no orthopnea, no syncope, no presyncopal episodes.  Functional capacity is fine, limited due to shortness of breath  Past Medical History:   Diagnosis Date    Anemia     Blood transfusion 2004    16 units plus BLEEDING ULCER    Cancer (HCC) 1971    uterus    Chronic obstructive pulmonary disease (HCC)     Hx of blood clots     LEG ON PLAVIX    Hyperlipidemia     Hypertension     PVD (peripheral vascular disease)          Past Surgical History:   Procedure Laterality Date    ABDOMEN SURGERY      repair bleeding ulcer    APPENDECTOMY      CARDIAC CATHETERIZATION  07/18/2018    Dr. Vasquez    COLON SURGERY  2005    6 inches colon removed    COLONOSCOPY      COLONOSCOPY N/A 11/29/2024    COLONOSCOPY DIAGNOSTIC performed by Hector Pack MD at [facility] ENDOSCOPY    ENDOSCOPY, COLON, DIAGNOSTIC      HYSTERECTOMY (CERVIX STATUS UNKNOWN)      OTHER SURGICAL HISTORY  2004    blood clot removed from rt. leg x 2     SPINE SURGERY N/A 11/13/2020    KYPHOPLASTY T7, T8 performed by Philip Shaw DO at [facility] [place]    TONGUE BIOPSY      x 2    TRANSLUMINAL ANGIOPLASTY Right 4/15/15    stent Rt  distal SFA    UPPER GASTROINTESTINAL ENDOSCOPY N/A 11/27/2024    ESOPHAGOGASTRODUODENOSCOPY BIOPSY performed by TRAVIS Marinelli MD at [facility] ENDOSCOPY    VASCULAR SURGERY  2009    stent rt. leg    VASCULAR SURGERY  2014

## (undated) DEVICE — GAUZE,SPONGE,4"X4",16PLY,XRAY,STRL,LF: Brand: MEDLINE

## (undated) DEVICE — NEEDLE SPNL GRN 18GAX3 1/2IN

## (undated) DEVICE — MASK CAPNOGRAPHY AD W35IN DIA58IN SAMP LN L10FT O2 LN

## (undated) DEVICE — APPLICATOR PREP 26ML 0.7% IOD POVACRYLEX 74% ISO ALC ST

## (undated) DEVICE — BONE BIOPSY DEVICE F05A BBD SIZE 3: Brand: MEDTRONIC REUSABLE INSTRUMENTS

## (undated) DEVICE — Device: Brand: DEFENDO VALVE AND CONNECTOR KIT

## (undated) DEVICE — COVER,LIGHT HANDLE,FLX,2/PK: Brand: MEDLINE INDUSTRIES, INC.

## (undated) DEVICE — JELLY,LUBE,STERILE,FLIP TOP,TUBE,4-OZ: Brand: MEDLINE

## (undated) DEVICE — 4-PORT MANIFOLD: Brand: NEPTUNE 2

## (undated) DEVICE — AIR/WATER CLEANING ADAPTER FOR OLYMPUS® GI ENDOSCOPE: Brand: BULLDOG®

## (undated) DEVICE — INTRODUCER T15E AOIS: Brand: KYPHON® ADVANCED OSTEO INTRODUCER® SYSTEM

## (undated) DEVICE — SPONGE GZ 4IN 4IN 4 PLY N WVN AVANT

## (undated) DEVICE — INTENDED FOR TISSUE SEPARATION, AND OTHER PROCEDURES THAT REQUIRE A SHARP SURGICAL BLADE TO PUNCTURE OR CUT.: Brand: BARD-PARKER ® STAINLESS STEEL BLADES

## (undated) DEVICE — CONTAINER SPEC COLL 960ML POLYPR TRIANG GRAD INTAKE/OUTPUT

## (undated) DEVICE — PACK,LAPAROTOMY,NO GOWNS: Brand: MEDLINE

## (undated) DEVICE — NEEDLE BNE ACCS SZ 3 11GA DMND FOR VERTPLSTY EXPR FRAC

## (undated) DEVICE — GLOVE SURG SZ 85 L12IN FNGR THK13MIL WHT ISOLEX POLYISOPRENE

## (undated) DEVICE — DRAPE C ARM W41XL74IN UNIV MOB W RUBBERBAND CLP

## (undated) DEVICE — INTRODUCER T15K ONE STEP OID BEVEL: Brand: KYPHON® ONE-STEP™ OSTEO INTRODUCER™ SYSTEM

## (undated) DEVICE — 3M™ IOBAN™ 2 ANTIMICROBIAL INCISE DRAPE 6650EZ: Brand: IOBAN™ 2

## (undated) DEVICE — SWABSTICK SURG PREP BENZOIN TINCTURE SINGLE ST

## (undated) DEVICE — TRAY EPI W/ 18GA 3.5IN TUOHY BKEYE LUMN PENCAN 27GA 5IN

## (undated) DEVICE — ADAPTER CLEANING PORPOISE CLEANING

## (undated) DEVICE — SYRINGE MED 10ML LUERLOCK TIP W/O SFTY DISP

## (undated) DEVICE — SHEET,DRAPE,53X77,STERILE: Brand: MEDLINE

## (undated) DEVICE — COUNTER NDL 30 COUNT DBL MAG

## (undated) DEVICE — KIT KPT1504 KYPAK TRY 15/3 ADDL FRACTURE: Brand: KYPHOPAK™ TRAY

## (undated) DEVICE — SUPPLEMENT DIGESTIVE H2O SOL GI-EASE

## (undated) DEVICE — TOWEL,OR,DSP,ST,BLUE,STD,6/PK,12PK/CS: Brand: MEDLINE

## (undated) DEVICE — COVER,TABLE,60X90,STERILE: Brand: MEDLINE

## (undated) DEVICE — BANDAGE WND ADH LF FLX CURAD 3/4X3IN

## (undated) DEVICE — DOUBLE BASIN SET: Brand: MEDLINE INDUSTRIES, INC.

## (undated) DEVICE — DRAPE THER FLUID WARMING 66X44 IN FLAT SLUSH DBL DISC ORS

## (undated) DEVICE — TRAP,MUCUS SPECIMEN,40CC: Brand: MEDLINE

## (undated) DEVICE — SYRINGE A08E KIS INFLATION HP: Brand: KYPHON®  INFLATION SYRINGE

## (undated) DEVICE — BLOCK BITE 60FR CAREGUARD

## (undated) DEVICE — FORCEPS BX L240CM JAW DIA2.8MM L CAP W/ NDL MIC MESH TOOTH

## (undated) DEVICE — GOWN,BREATHABLE SLV,AURORA,XLG,STRL: Brand: MEDLINE

## (undated) DEVICE — KENDALL 450 SERIES MONITORING FOAM ELECTRODE - RECTANGULAR SHAPE ( 3/PK): Brand: KENDALL

## (undated) DEVICE — 1000 S-DRAPE TOWEL DRAPE 10/BX: Brand: STERI-DRAPE™

## (undated) DEVICE — KIT BEDSIDE REVITAL OX 500ML

## (undated) DEVICE — BAG SPECIMEN BIOHAZARD 6IN X 9IN

## (undated) DEVICE — TUBING, SUCTION, 1/4" X 10', STRAIGHT: Brand: MEDLINE

## (undated) DEVICE — YANKAUER,BULB TIP,W/O VENT,RIGID,STERILE: Brand: MEDLINE

## (undated) DEVICE — STRIP,CLOSURE,WOUND,MEDI-STRIP,1/2X4: Brand: MEDLINE